# Patient Record
Sex: MALE | Race: OTHER | HISPANIC OR LATINO
[De-identification: names, ages, dates, MRNs, and addresses within clinical notes are randomized per-mention and may not be internally consistent; named-entity substitution may affect disease eponyms.]

---

## 2022-07-07 ENCOUNTER — APPOINTMENT (OUTPATIENT)
Dept: RADIOLOGY | Facility: HOSPITAL | Age: 62
End: 2022-07-07

## 2022-07-07 ENCOUNTER — OUTPATIENT (OUTPATIENT)
Dept: OUTPATIENT SERVICES | Facility: HOSPITAL | Age: 62
LOS: 1 days | End: 2022-07-07
Payer: SELF-PAY

## 2022-07-07 ENCOUNTER — TRANSCRIPTION ENCOUNTER (OUTPATIENT)
Age: 62
End: 2022-07-07

## 2022-07-07 DIAGNOSIS — Z00.8 ENCOUNTER FOR OTHER GENERAL EXAMINATION: ICD-10-CM

## 2022-07-07 PROCEDURE — 72100 X-RAY EXAM L-S SPINE 2/3 VWS: CPT

## 2022-07-07 PROCEDURE — 72100 X-RAY EXAM L-S SPINE 2/3 VWS: CPT | Mod: 26

## 2023-03-01 ENCOUNTER — EMERGENCY (EMERGENCY)
Facility: HOSPITAL | Age: 63
LOS: 1 days | Discharge: HOME | End: 2023-03-01
Attending: EMERGENCY MEDICINE | Admitting: EMERGENCY MEDICINE
Payer: SELF-PAY

## 2023-03-01 VITALS
WEIGHT: 179.9 LBS | HEIGHT: 65 IN | RESPIRATION RATE: 18 BRPM | DIASTOLIC BLOOD PRESSURE: 91 MMHG | SYSTOLIC BLOOD PRESSURE: 149 MMHG | HEART RATE: 70 BPM | TEMPERATURE: 98 F | OXYGEN SATURATION: 98 %

## 2023-03-01 VITALS
RESPIRATION RATE: 18 BRPM | SYSTOLIC BLOOD PRESSURE: 150 MMHG | HEART RATE: 65 BPM | OXYGEN SATURATION: 97 % | DIASTOLIC BLOOD PRESSURE: 86 MMHG

## 2023-03-01 PROCEDURE — 73110 X-RAY EXAM OF WRIST: CPT | Mod: 26,LT

## 2023-03-01 PROCEDURE — 99283 EMERGENCY DEPT VISIT LOW MDM: CPT

## 2023-03-01 PROCEDURE — 99284 EMERGENCY DEPT VISIT MOD MDM: CPT

## 2023-03-01 PROCEDURE — 73110 X-RAY EXAM OF WRIST: CPT

## 2023-03-01 RX ORDER — IBUPROFEN 200 MG
600 TABLET ORAL ONCE
Refills: 0 | Status: COMPLETED | OUTPATIENT
Start: 2023-03-01 | End: 2023-03-01

## 2023-03-01 RX ADMIN — Medication 600 MILLIGRAM(S): at 22:20

## 2023-03-01 NOTE — ED PROVIDER NOTE - CARE PROVIDER_API CALL
Germán Israel (MD)  Orthopedics  [address] [facility] [address], [facility] [address]  [place] [address]  Phone: [phone]  Fax: [phone]  Follow Up Time:

## 2023-03-01 NOTE — ED PROVIDER NOTE - OBJECTIVE STATEMENT
62-year-old male with no significant past medical history presents presents to ED complaining left wrist pain and swelling since this morning s/p fall on the ice.  As per patient he was walking near his house fell backwards he tried to prevent his fall from the left hand since then he has a swelling of the wrist and pain.  Denies tingling or numbness in the fingers

## 2023-03-01 NOTE — ED PROVIDER NOTE - CLINICAL SUMMARY MEDICAL DECISION MAKING FREE TEXT BOX
pt p/w left wrist pain and swelling after fall, on exam had mod edema of wrist.  xray was negative for major fracture, pt was given prefabricated wrist splint and sling and advised cold compress and advised f/u orthopedic

## 2023-03-01 NOTE — ED PROVIDER NOTE - PHYSICAL EXAMINATION
General:     NAD, well-nourished, well-appearing  Head:     NC/AT, EOMI, oral mucosa moist  Neck:     supple  Lungs:     CTA b/l, no w/r/r  CVS:     S1S2, RRR, no m/g/r  Abd:     +BS, s/nt/nd, no organomegaly  Ext:    2+ radial and pedal pulses, no c/c/e  left wrist no gross deformity. mod edema of wrist and decreased ROM, distal NV intact   Neuro: grossly intact

## 2023-03-01 NOTE — ED ADULT NURSE NOTE - OBJECTIVE STATEMENT
pt axo3, c/o wrist pain. pt states he s/p slip and fell this morning. pt denies hitting his head or other extremities. pt states he took tylenol and motrin this am for pain. pt denies chest pain or SOB. safety maintained.

## 2023-03-01 NOTE — ED PROVIDER NOTE - PATIENT PORTAL LINK FT
You can access the FollowMyHealth Patient Portal offered by [facility] by registering at the following website: [email or web address]. By joining [employer]s FollowMyHealth portal, you will also be able to view your health information using other applications (apps) compatible with our system.

## 2023-03-01 NOTE — ED PROVIDER NOTE - NSFOLLOWUPINSTRUCTIONS_ED_ALL_ED_FT
wrist Sprain    A sprain is a stretch or tear in one of the tough, fiber-like tissues (ligaments) in your body. This is caused by an injury to the area such as a twisting mechanism. Symptoms include pain, swelling, or bruising. Rest that area over the next several days and slowly resume activity when tolerated. Ice can help with swelling and pain.   keep wrist in splint ,   apply cold compress  Take Ibuprofen as prescribed   follow up with orthopedic as directed     SEEK IMMEDIATE MEDICAL CARE IF YOU HAVE ANY OF THE FOLLOWING SYMPTOMS: worsening pain, inability to move that body part, numbness or tingling.

## 2023-05-11 ENCOUNTER — TRANSCRIPTION ENCOUNTER (OUTPATIENT)
Age: 63
End: 2023-05-11

## 2023-05-11 ENCOUNTER — INPATIENT (INPATIENT)
Facility: HOSPITAL | Age: 63
LOS: 4 days | Discharge: HOME | DRG: 854 | End: 2023-05-16
Attending: STUDENT IN AN ORGANIZED HEALTH CARE EDUCATION/TRAINING PROGRAM | Admitting: INTERNAL MEDICINE
Payer: MEDICAID

## 2023-05-11 VITALS
HEIGHT: 62 IN | DIASTOLIC BLOOD PRESSURE: 112 MMHG | WEIGHT: 177.91 LBS | TEMPERATURE: 98 F | HEART RATE: 75 BPM | RESPIRATION RATE: 19 BRPM | OXYGEN SATURATION: 96 % | SYSTOLIC BLOOD PRESSURE: 159 MMHG

## 2023-05-11 DIAGNOSIS — K83.09 OTHER CHOLANGITIS: ICD-10-CM

## 2023-05-11 DIAGNOSIS — K83.8 OTHER SPECIFIED DISEASES OF BILIARY TRACT: ICD-10-CM

## 2023-05-11 DIAGNOSIS — R79.89 OTHER SPECIFIED ABNORMAL FINDINGS OF BLOOD CHEMISTRY: ICD-10-CM

## 2023-05-11 LAB
ALBUMIN SERPL ELPH-MCNC: 3.8 G/DL — SIGNIFICANT CHANGE UP (ref 3.3–5)
ALP SERPL-CCNC: 157 U/L — HIGH (ref 40–120)
ALT FLD-CCNC: 139 U/L — HIGH (ref 10–45)
ANION GAP SERPL CALC-SCNC: 8 MMOL/L — SIGNIFICANT CHANGE UP (ref 5–17)
APPEARANCE UR: CLEAR — SIGNIFICANT CHANGE UP
AST SERPL-CCNC: 138 U/L — HIGH (ref 10–40)
BACTERIA # UR AUTO: NEGATIVE /HPF — SIGNIFICANT CHANGE UP
BASOPHILS # BLD AUTO: 0.03 K/UL — SIGNIFICANT CHANGE UP (ref 0–0.2)
BASOPHILS NFR BLD AUTO: 0.3 % — SIGNIFICANT CHANGE UP (ref 0–2)
BILIRUB SERPL-MCNC: 2.5 MG/DL — HIGH (ref 0.2–1.2)
BILIRUB UR-MCNC: ABNORMAL
BLD GP AB SCN SERPL QL: SIGNIFICANT CHANGE UP
BUN SERPL-MCNC: 17 MG/DL — SIGNIFICANT CHANGE UP (ref 7–23)
CALCIUM SERPL-MCNC: 9 MG/DL — SIGNIFICANT CHANGE UP (ref 8.4–10.5)
CHLORIDE SERPL-SCNC: 100 MMOL/L — SIGNIFICANT CHANGE UP (ref 96–108)
CO2 SERPL-SCNC: 28 MMOL/L — SIGNIFICANT CHANGE UP (ref 22–31)
COLOR SPEC: SIGNIFICANT CHANGE UP
CREAT SERPL-MCNC: 0.97 MG/DL — SIGNIFICANT CHANGE UP (ref 0.5–1.3)
DIFF PNL FLD: NEGATIVE — SIGNIFICANT CHANGE UP
EGFR: 88 ML/MIN/1.73M2 — SIGNIFICANT CHANGE UP
EOSINOPHIL # BLD AUTO: 0.06 K/UL — SIGNIFICANT CHANGE UP (ref 0–0.5)
EOSINOPHIL NFR BLD AUTO: 0.6 % — SIGNIFICANT CHANGE UP (ref 0–6)
EPI CELLS # UR: 0 — SIGNIFICANT CHANGE UP
GLUCOSE SERPL-MCNC: 137 MG/DL — HIGH (ref 70–99)
GLUCOSE UR QL: NEGATIVE MG/DL — SIGNIFICANT CHANGE UP
HCT VFR BLD CALC: 44.7 % — SIGNIFICANT CHANGE UP (ref 39–50)
HGB BLD-MCNC: 15.4 G/DL — SIGNIFICANT CHANGE UP (ref 13–17)
IMM GRANULOCYTES NFR BLD AUTO: 0.4 % — SIGNIFICANT CHANGE UP (ref 0–0.9)
KETONES UR-MCNC: NEGATIVE MG/DL — SIGNIFICANT CHANGE UP
LEUKOCYTE ESTERASE UR-ACNC: NEGATIVE — SIGNIFICANT CHANGE UP
LIDOCAIN IGE QN: 154 U/L — SIGNIFICANT CHANGE UP (ref 73–393)
LYMPHOCYTES # BLD AUTO: 0.38 K/UL — LOW (ref 1–3.3)
LYMPHOCYTES # BLD AUTO: 3.5 % — LOW (ref 13–44)
MCHC RBC-ENTMCNC: 31.2 PG — SIGNIFICANT CHANGE UP (ref 27–34)
MCHC RBC-ENTMCNC: 34.5 GM/DL — SIGNIFICANT CHANGE UP (ref 32–36)
MCV RBC AUTO: 90.5 FL — SIGNIFICANT CHANGE UP (ref 80–100)
MONOCYTES # BLD AUTO: 0.16 K/UL — SIGNIFICANT CHANGE UP (ref 0–0.9)
MONOCYTES NFR BLD AUTO: 1.5 % — LOW (ref 2–14)
NEUTROPHILS # BLD AUTO: 10.05 K/UL — HIGH (ref 1.8–7.4)
NEUTROPHILS NFR BLD AUTO: 93.7 % — HIGH (ref 43–77)
NITRITE UR-MCNC: NEGATIVE — SIGNIFICANT CHANGE UP
NRBC # BLD: 0 /100 WBCS — SIGNIFICANT CHANGE UP (ref 0–0)
PH UR: 6.5 — SIGNIFICANT CHANGE UP (ref 5–8)
PLATELET # BLD AUTO: 177 K/UL — SIGNIFICANT CHANGE UP (ref 150–400)
POTASSIUM SERPL-MCNC: 3.7 MMOL/L — SIGNIFICANT CHANGE UP (ref 3.5–5.3)
POTASSIUM SERPL-SCNC: 3.7 MMOL/L — SIGNIFICANT CHANGE UP (ref 3.5–5.3)
PROT SERPL-MCNC: 7.4 G/DL — SIGNIFICANT CHANGE UP (ref 6–8.3)
PROT UR-MCNC: 30 MG/DL
RAPID RVP RESULT: SIGNIFICANT CHANGE UP
RBC # BLD: 4.94 M/UL — SIGNIFICANT CHANGE UP (ref 4.2–5.8)
RBC # FLD: 13 % — SIGNIFICANT CHANGE UP (ref 10.3–14.5)
RBC CASTS # UR COMP ASSIST: 0 /HPF — SIGNIFICANT CHANGE UP (ref 0–4)
SARS-COV-2 RNA SPEC QL NAA+PROBE: SIGNIFICANT CHANGE UP
SODIUM SERPL-SCNC: 136 MMOL/L — SIGNIFICANT CHANGE UP (ref 135–145)
SP GR SPEC: 1.01 — SIGNIFICANT CHANGE UP (ref 1–1.03)
UROBILINOGEN FLD QL: 4 MG/DL (ref 0.2–1)
WBC # BLD: 10.72 K/UL — HIGH (ref 3.8–10.5)
WBC # FLD AUTO: 10.72 K/UL — HIGH (ref 3.8–10.5)
WBC UR QL: 0 /HPF — SIGNIFICANT CHANGE UP (ref 0–5)

## 2023-05-11 PROCEDURE — 74022 RADEX COMPL AQT ABD SERIES: CPT | Mod: 26

## 2023-05-11 PROCEDURE — 99223 1ST HOSP IP/OBS HIGH 75: CPT

## 2023-05-11 PROCEDURE — 76705 ECHO EXAM OF ABDOMEN: CPT | Mod: 26

## 2023-05-11 PROCEDURE — 74177 CT ABD & PELVIS W/CONTRAST: CPT | Mod: 26,MA

## 2023-05-11 PROCEDURE — 99285 EMERGENCY DEPT VISIT HI MDM: CPT

## 2023-05-11 RX ORDER — AMLODIPINE BESYLATE 2.5 MG/1
5 TABLET ORAL DAILY
Refills: 0 | Status: DISCONTINUED | OUTPATIENT
Start: 2023-05-12 | End: 2023-05-15

## 2023-05-11 RX ORDER — INDOMETHACIN 50 MG
50 CAPSULE ORAL ONCE
Refills: 0 | Status: COMPLETED | OUTPATIENT
Start: 2023-05-12 | End: 2023-05-12

## 2023-05-11 RX ORDER — PIPERACILLIN AND TAZOBACTAM 4; .5 G/20ML; G/20ML
3.38 INJECTION, POWDER, LYOPHILIZED, FOR SOLUTION INTRAVENOUS ONCE
Refills: 0 | Status: COMPLETED | OUTPATIENT
Start: 2023-05-11 | End: 2023-05-11

## 2023-05-11 RX ORDER — FAMOTIDINE 10 MG/ML
20 INJECTION INTRAVENOUS ONCE
Refills: 0 | Status: COMPLETED | OUTPATIENT
Start: 2023-05-11 | End: 2023-05-11

## 2023-05-11 RX ORDER — LANOLIN ALCOHOL/MO/W.PET/CERES
3 CREAM (GRAM) TOPICAL AT BEDTIME
Refills: 0 | Status: DISCONTINUED | OUTPATIENT
Start: 2023-05-11 | End: 2023-05-15

## 2023-05-11 RX ORDER — ACETAMINOPHEN 500 MG
650 TABLET ORAL EVERY 6 HOURS
Refills: 0 | Status: DISCONTINUED | OUTPATIENT
Start: 2023-05-11 | End: 2023-05-15

## 2023-05-11 RX ORDER — MORPHINE SULFATE 50 MG/1
4 CAPSULE, EXTENDED RELEASE ORAL ONCE
Refills: 0 | Status: DISCONTINUED | OUTPATIENT
Start: 2023-05-11 | End: 2023-05-11

## 2023-05-11 RX ORDER — ACETAMINOPHEN 500 MG
650 TABLET ORAL ONCE
Refills: 0 | Status: COMPLETED | OUTPATIENT
Start: 2023-05-11 | End: 2023-05-11

## 2023-05-11 RX ORDER — MORPHINE SULFATE 50 MG/1
2 CAPSULE, EXTENDED RELEASE ORAL EVERY 4 HOURS
Refills: 0 | Status: DISCONTINUED | OUTPATIENT
Start: 2023-05-11 | End: 2023-05-15

## 2023-05-11 RX ORDER — ONDANSETRON 8 MG/1
4 TABLET, FILM COATED ORAL ONCE
Refills: 0 | Status: COMPLETED | OUTPATIENT
Start: 2023-05-11 | End: 2023-05-11

## 2023-05-11 RX ORDER — ONDANSETRON 8 MG/1
4 TABLET, FILM COATED ORAL EVERY 8 HOURS
Refills: 0 | Status: DISCONTINUED | OUTPATIENT
Start: 2023-05-11 | End: 2023-05-15

## 2023-05-11 RX ORDER — ASPIRIN/CALCIUM CARB/MAGNESIUM 324 MG
81 TABLET ORAL DAILY
Refills: 0 | Status: DISCONTINUED | OUTPATIENT
Start: 2023-05-11 | End: 2023-05-14

## 2023-05-11 RX ORDER — ENOXAPARIN SODIUM 100 MG/ML
40 INJECTION SUBCUTANEOUS EVERY 24 HOURS
Refills: 0 | Status: DISCONTINUED | OUTPATIENT
Start: 2023-05-11 | End: 2023-05-14

## 2023-05-11 RX ORDER — FAMOTIDINE 10 MG/ML
20 INJECTION INTRAVENOUS ONCE
Refills: 0 | Status: DISCONTINUED | OUTPATIENT
Start: 2023-05-11 | End: 2023-05-11

## 2023-05-11 RX ORDER — SODIUM CHLORIDE 9 MG/ML
2000 INJECTION INTRAMUSCULAR; INTRAVENOUS; SUBCUTANEOUS ONCE
Refills: 0 | Status: COMPLETED | OUTPATIENT
Start: 2023-05-11 | End: 2023-05-11

## 2023-05-11 RX ORDER — PIPERACILLIN AND TAZOBACTAM 4; .5 G/20ML; G/20ML
3.38 INJECTION, POWDER, LYOPHILIZED, FOR SOLUTION INTRAVENOUS EVERY 8 HOURS
Refills: 0 | Status: DISCONTINUED | OUTPATIENT
Start: 2023-05-11 | End: 2023-05-15

## 2023-05-11 RX ADMIN — ONDANSETRON 4 MILLIGRAM(S): 8 TABLET, FILM COATED ORAL at 15:15

## 2023-05-11 RX ADMIN — Medication 650 MILLIGRAM(S): at 17:17

## 2023-05-11 RX ADMIN — PIPERACILLIN AND TAZOBACTAM 3.38 GRAM(S): 4; .5 INJECTION, POWDER, LYOPHILIZED, FOR SOLUTION INTRAVENOUS at 17:32

## 2023-05-11 RX ADMIN — SODIUM CHLORIDE 2000 MILLILITER(S): 9 INJECTION INTRAMUSCULAR; INTRAVENOUS; SUBCUTANEOUS at 15:14

## 2023-05-11 RX ADMIN — FAMOTIDINE 100 MILLIGRAM(S): 10 INJECTION INTRAVENOUS at 15:17

## 2023-05-11 RX ADMIN — PIPERACILLIN AND TAZOBACTAM 200 GRAM(S): 4; .5 INJECTION, POWDER, LYOPHILIZED, FOR SOLUTION INTRAVENOUS at 17:02

## 2023-05-11 RX ADMIN — Medication 650 MILLIGRAM(S): at 23:07

## 2023-05-11 RX ADMIN — MORPHINE SULFATE 4 MILLIGRAM(S): 50 CAPSULE, EXTENDED RELEASE ORAL at 15:45

## 2023-05-11 RX ADMIN — PIPERACILLIN AND TAZOBACTAM 25 GRAM(S): 4; .5 INJECTION, POWDER, LYOPHILIZED, FOR SOLUTION INTRAVENOUS at 22:05

## 2023-05-11 RX ADMIN — Medication 650 MILLIGRAM(S): at 22:07

## 2023-05-11 RX ADMIN — FAMOTIDINE 20 MILLIGRAM(S): 10 INJECTION INTRAVENOUS at 15:47

## 2023-05-11 RX ADMIN — MORPHINE SULFATE 4 MILLIGRAM(S): 50 CAPSULE, EXTENDED RELEASE ORAL at 15:15

## 2023-05-11 RX ADMIN — SODIUM CHLORIDE 2000 MILLILITER(S): 9 INJECTION INTRAMUSCULAR; INTRAVENOUS; SUBCUTANEOUS at 17:00

## 2023-05-11 NOTE — ED PROVIDER NOTE - CLINICAL SUMMARY MEDICAL DECISION MAKING FREE TEXT BOX
adult male with cute onset epigastric abdominal pain and rigors.  Vitals are normal no fever no tachycardia no hypotension.  On exam has epigastric tenderness palpation mild right upper quadrant tenderness palpation.  We will initially get this chest x-ray to rule out free air.  Plan for blood work urinalysis CT abdomen pelvis to rule out intra-abdominal cause of infection.  Could also be gastritis or stomach ulcer or peptic ulcer.  Could also be pancreatitis.  We will get a lipase.  Disposition as per work-up.

## 2023-05-11 NOTE — ED ADULT NURSE NOTE - OBJECTIVE STATEMENT
Pt came from home with c/o epigastric pain since this morning. Pt with hx HTN. States that shortly after eating breakfast this morning he had an onset of 10/10 epigastric pain. Pt with c/o chills and nausea on arrival. No fevers, diarrhea or any other complaints.

## 2023-05-11 NOTE — ED PROVIDER NOTE - OBJECTIVE STATEMENT
62-year-old male history of hypertension presents to the ED with acute onset upper abdominal pain that started early this morning.  Associated with nausea and vomiting.  Also having full body chills and rigors.  No diarrhea.  Non-smoker no alcohol use no drug use.  No recent travel.  No sick contacts at home.  Does not use Advil or Tylenol.

## 2023-05-11 NOTE — H&P ADULT - HISTORY OF PRESENT ILLNESS
63yo male PMH of HTN presenting to Er with cc of abdominal pain  63yo male PMH of HTN presenting to Er with cc of abdominal pain.  Patient stated that his pain started acutely in the middle of his abdomen since 9AM.  He stated he drank some fer hoping that it would get better but it progressively worsened.  At 1pm he started to vomit and his pain traveled to his RUQ.  He decided to come to the ER for further evaluation.  He denies fever, cough, chest pain, Travel hx, diarrhea.  He has never had a colonoscopy in the past.      In the ER, GI was called

## 2023-05-11 NOTE — ED PROVIDER NOTE - CONSTITUTIONAL, MLM
normal... patient is actively having rigors, awake, alert, oriented to person, place, time/situation and in no apparent distress.

## 2023-05-11 NOTE — H&P ADULT - ASSESSMENT
63yo male PMH of HTN presenting to Er with cc of abdominal pain.    #Severe sepsis with fever, leukocytosis, tachycardia likely due to acute cholecystitis with CBD dilatation with transaminitis and hyperbilirubinemia  CONSULT: GI and surgery called by ER; Dr Ryan and Dr Liao  -Will monitor F/WBC count, f/u UCX, BCX  -NPO, IVF, zosyn 3.375gm IV q8hrs  -For ERCP in AM as per GI  -zofran prn, tylenol/morphine prn pain  -Monitor LFTs    #HTN  -C/w norvasc 5mg daily, ASA 81mg daily    DVT PPX: Lovenox  AM labs, Full code  DISP: Pending course  5/11:Sangita daughter [phone] at bedside, answered all questions, in agreement with care plan

## 2023-05-11 NOTE — H&P ADULT - NSHPLABSRESULTS_GEN_ALL_CORE
15.4   10.72 )-----------( 177      ( 11 May 2023 15:20 )             44.7           136  |  100  |  17  ----------------------------<  137  3.7   |  28  |  0.97    Ca    9.0      11 May 2023 15:20    TPro  7.4  /  Alb  3.8  /  TBili  2.5  /  DBili  x   /  AST  138  /  ALT  139  /  AlkPhos  157      Urinalysis Basic - ( 11 May 2023 16:10 )    Color: Dark Yellow / Appearance: Clear / S.015 / pH: x  Gluc: x / Ketone: Negative mg/dL  / Bili: Small / Urobili: 4.0 mg/dL   Blood: x / Protein: 30 mg/dL / Nitrite: Negative   Leuk Esterase: Negative / RBC: 0 /HPF / WBC 0 /HPF   Sq Epi: x / Non Sq Epi: x / Bacteria: Negative /HPF    Lipase, Serum: 154 U/L (23 @ 15:20)  SARS-CoV-2: NotDetec (11 May 2023 15:20)    RADIOLOGY  CT Abdomen and Pelvis w/ IV Cont (23 @ 16:46) >  IMPRESSION:  Gallbladder distention with biliary ductal dilatation. No radiopaque   cholelithiasis or choledocholithiasis. Recommend correlating to the   presence of a Rosenbaum sign as well as correlation with LFTs. Further   evaluation with gallbladder ultrasound can beconsidered as clinically   warranted.< from: US Abdomen Upper Quadrant Right (23 @ 16:01) >    IMPRESSION:  Increased caliber of the extrahepatic CBD identified measuring 11 mm.   Gallstone and echogenic sludge. No gallbladder wall thickening.    Consultant(s) Notes Reveiwed [x ] Yes   GI   Care Discussed with [x ] Consultants  [ x] Patient  [ ] Family  [ ] /   [ x] Other; RN

## 2023-05-11 NOTE — H&P ADULT - NSHPREVIEWOFSYSTEMS_GEN_ALL_CORE
REVIEW OF SYSTEMS:  CONSTITUTIONAL: No fever, weight loss, or fatigue  EYES: No eye pain, visual disturbances, or discharge  ENMT:  No difficulty hearing, tinnitus, vertigo; No sinus or throat pain  NECK: No pain or stiffness  BREASTS: No pain, masses, or nipple discharge  RESPIRATORY: No cough, wheezing, chills or hemoptysis; No shortness of breath  CARDIOVASCULAR: No chest pain, palpitations, dizziness, or leg swelling  GASTROINTESTINAL: No abdominal or epigastric pain. No nausea, vomiting, or hematemesis; No diarrhea or constipation. No melena or hematochezia.  GENITOURINARY: No dysuria, frequency, hematuria, or incontinence  NEUROLOGICAL: No headaches, memory loss, loss of strength, numbness, or tremors  SKIN: No itching, burning, rashes, or lesions   LYMPH NODES: No enlarged glands  ENDOCRINE: No heat or cold intolerance; No hair loss  MUSCULOSKELETAL: No joint pain or swelling; No muscle, back, or extremity pain  PSYCHIATRIC: No depression, anxiety, mood swings, or difficulty sleeping  HEME/LYMPH: No easy bruising, or bleeding gums  ALLERY AND IMMUNOLOGIC: No hives or eczema    ALL ROS REVIEWED AND NORMAL EXCEPT AS STATED ABOVE REVIEW OF SYSTEMS:  CONSTITUTIONAL: No fever, weight loss, or fatigue  EYES: No eye pain, visual disturbances, or discharge  ENMT:  No difficulty hearing, tinnitus, vertigo; No sinus or throat pain  NECK: No pain or stiffness  BREASTS: No pain, masses, or nipple discharge  RESPIRATORY: No cough, wheezing, chills or hemoptysis; No shortness of breath  CARDIOVASCULAR: No chest pain, palpitations, dizziness, or leg swelling  GASTROINTESTINAL:+++ abdominal/epigastric pain. ++ nausea, vomiting; No diarrhea or constipation. No melena or hematochezia.  GENITOURINARY: No dysuria, frequency, hematuria, or incontinence  NEUROLOGICAL: No headaches, memory loss, loss of strength, numbness, or tremors  SKIN: No itching, burning, rashes, or lesions   LYMPH NODES: No enlarged glands  ENDOCRINE: No heat or cold intolerance; No hair loss  MUSCULOSKELETAL: No joint pain or swelling; No muscle, back, or extremity pain  PSYCHIATRIC: No depression, anxiety, mood swings, or difficulty sleeping  HEME/LYMPH: No easy bruising, or bleeding gums  ALLERY AND IMMUNOLOGIC: No hives or eczema    ALL ROS REVIEWED AND NORMAL EXCEPT AS STATED ABOVE

## 2023-05-11 NOTE — ED ADULT NURSE NOTE - NSFALLUNIVINTERV_ED_ALL_ED
Bed/Stretcher in lowest position, wheels locked, appropriate side rails in place/Call bell, personal items and telephone in reach/Instruct patient to call for assistance before getting out of bed/chair/stretcher/Non-slip footwear applied when patient is off stretcher/[place] to call system/Physically safe environment - no spills, clutter or unnecessary equipment/Purposeful proactive rounding/Room/bathroom lighting operational, light cord in reach

## 2023-05-11 NOTE — H&P ADULT - NSHPPHYSICALEXAM_GEN_ALL_CORE
Vital Signs Last 24 Hrs  T(F): 104.7 (11 May 2023 17:04), Max: 104.7 (11 May 2023 17:04)  HR: 109 (11 May 2023 17:04) (75 - 109)  BP: 112/68 (11 May 2023 17:04) (112/68 - 159/112)  RR: 18 (11 May 2023 17:04) (18 - 19)  SpO2: 96% (11 May 2023 17:04) (96% - 96%)    PHYSICAL EXAM:  GENERAL: NAD, well-groomed, well-developed  HEAD:  Atraumatic, Normocephalic  EYES: EOMI, conjunctiva and sclera clear  ENMT: Moist mucous membranes, Good dentition, no thrush  NECK: Supple, No JVD  CHEST/LUNG: Clear to auscultation bilaterally, good air entry, non-labored breathing  HEART: RRR; S1/S2, No murmur  ABDOMEN: Soft, Nontender, Nondistended; Bowel sounds present  VASCULAR: Normal pulses, Normal capillary refill  EXTREMITIES: No calf tenderness, No cyanosis, No edema  LYMPH: Normal; No lymphadenopathy noted  SKIN: Warm, Intact  PSYCH: Normal mood, Normal affect  NERVOUS SYSTEM:  A/O x3, Good concentration; CN 2-12 intact, No focal deficits Vital Signs Last 24 Hrs  T(F): 104.7 (11 May 2023 17:04), Max: 104.7 (11 May 2023 17:04)  HR: 109 (11 May 2023 17:04) (75 - 109)  BP: 112/68 (11 May 2023 17:04) (112/68 - 159/112)  RR: 18 (11 May 2023 17:04) (18 - 19)  SpO2: 96% (11 May 2023 17:04) (96% - 96%)    PHYSICAL EXAM:  GENERAL: NAD, AAox3, speaking in full sentences,  well-groomed, well-developed  HEAD:  Atraumatic, Normocephalic  EYES: EOMI, conjunctiva and sclera clear  ENMT: Moist mucous membranes, Good dentition, no thrush  NECK: Supple, No JVD  CHEST/LUNG: Clear to auscultation bilaterally, good air entry, non-labored breathing  HEART: RRR; S1/S2, No murmur  ABDOMEN: Soft, ++tenderness, protuberant, Bowel sounds present  VASCULAR: Normal pulses, Normal capillary refill  EXTREMITIES: No calf tenderness, No cyanosis, No edema  LYMPH: Normal; No lymphadenopathy noted  SKIN: Warm, Intact  PSYCH: Normal mood, Normal affect  NERVOUS SYSTEM:  A/O x3, Good concentration; CN 2-12 intact, No focal deficits

## 2023-05-11 NOTE — H&P ADULT - NSHPSOCIALHISTORY_GEN_ALL_CORE
Social History:    Marital Status: (  ) , (  ) Single, (  ) , (  ) , (  )   # of Children:   Lives with: (  ) alone, (  ) children, (  ) spouse, (  ) parents, (  ) siblings, (  ) friends, (  ) other:   Occupation:     Substance Use/Illicit Drugs: (  ) never used vs other:   Tobacco Usage: (  ) never smoked, (  ) former smoker, (  ) current smoker and Total Pack-Years:   Last Alcohol Usage/Frequency/Amount/Withdrawal/Hx of Abuse:    Foreign travel:   Animal exposure: Social History:    Marital Status: (x  ) , (  ) Single, (  ) , (  ) , (  )   # of Children: 2  Lives with: (  ) alone, (  ) children, (x  ) spouse, (  ) parents, (  ) siblings, (  ) friends, (  ) other:     Substance Use/Illicit Drugs: (x  ) never used vs other:   Tobacco Usage: (  x) never smoked, (  ) former smoker, (  ) current smoker and Total Pack-Years:   Last Alcohol Usage/Frequency/Amount/Withdrawal/Hx of Abuse:  Denies  Foreign travel: Denies  Animal exposure:Denies

## 2023-05-12 LAB
ALBUMIN SERPL ELPH-MCNC: 2.9 G/DL — LOW (ref 3.3–5)
ALP SERPL-CCNC: 118 U/L — SIGNIFICANT CHANGE UP (ref 40–120)
ALT FLD-CCNC: 272 U/L — HIGH (ref 10–45)
ANION GAP SERPL CALC-SCNC: 7 MMOL/L — SIGNIFICANT CHANGE UP (ref 5–17)
AST SERPL-CCNC: 205 U/L — HIGH (ref 10–40)
BILIRUB DIRECT SERPL-MCNC: 3.5 MG/DL — HIGH (ref 0–0.3)
BILIRUB INDIRECT FLD-MCNC: 1.4 MG/DL — HIGH (ref 0.2–1)
BILIRUB SERPL-MCNC: 4.9 MG/DL — HIGH (ref 0.2–1.2)
BUN SERPL-MCNC: 20 MG/DL — SIGNIFICANT CHANGE UP (ref 7–23)
CALCIUM SERPL-MCNC: 8 MG/DL — LOW (ref 8.4–10.5)
CHLORIDE SERPL-SCNC: 102 MMOL/L — SIGNIFICANT CHANGE UP (ref 96–108)
CO2 SERPL-SCNC: 28 MMOL/L — SIGNIFICANT CHANGE UP (ref 22–31)
CREAT SERPL-MCNC: 1.29 MG/DL — SIGNIFICANT CHANGE UP (ref 0.5–1.3)
CULTURE RESULTS: SIGNIFICANT CHANGE UP
EGFR: 62 ML/MIN/1.73M2 — SIGNIFICANT CHANGE UP
GLUCOSE SERPL-MCNC: 113 MG/DL — HIGH (ref 70–99)
HCT VFR BLD CALC: 40.2 % — SIGNIFICANT CHANGE UP (ref 39–50)
HCV AB S/CO SERPL IA: 0.07 S/CO — SIGNIFICANT CHANGE UP (ref 0–0.99)
HCV AB SERPL-IMP: SIGNIFICANT CHANGE UP
HGB BLD-MCNC: 13.7 G/DL — SIGNIFICANT CHANGE UP (ref 13–17)
MAGNESIUM SERPL-MCNC: 1.6 MG/DL — SIGNIFICANT CHANGE UP (ref 1.6–2.6)
MCHC RBC-ENTMCNC: 30.9 PG — SIGNIFICANT CHANGE UP (ref 27–34)
MCHC RBC-ENTMCNC: 34.1 GM/DL — SIGNIFICANT CHANGE UP (ref 32–36)
MCV RBC AUTO: 90.7 FL — SIGNIFICANT CHANGE UP (ref 80–100)
NRBC # BLD: 0 /100 WBCS — SIGNIFICANT CHANGE UP (ref 0–0)
PLATELET # BLD AUTO: 161 K/UL — SIGNIFICANT CHANGE UP (ref 150–400)
POTASSIUM SERPL-MCNC: 4.1 MMOL/L — SIGNIFICANT CHANGE UP (ref 3.5–5.3)
POTASSIUM SERPL-SCNC: 4.1 MMOL/L — SIGNIFICANT CHANGE UP (ref 3.5–5.3)
PROT SERPL-MCNC: 6.3 G/DL — SIGNIFICANT CHANGE UP (ref 6–8.3)
RBC # BLD: 4.43 M/UL — SIGNIFICANT CHANGE UP (ref 4.2–5.8)
RBC # FLD: 13.3 % — SIGNIFICANT CHANGE UP (ref 10.3–14.5)
SODIUM SERPL-SCNC: 137 MMOL/L — SIGNIFICANT CHANGE UP (ref 135–145)
SPECIMEN SOURCE: SIGNIFICANT CHANGE UP
WBC # BLD: 19.69 K/UL — HIGH (ref 3.8–10.5)
WBC # FLD AUTO: 19.69 K/UL — HIGH (ref 3.8–10.5)

## 2023-05-12 PROCEDURE — 99232 SBSQ HOSP IP/OBS MODERATE 35: CPT

## 2023-05-12 PROCEDURE — 43262 ENDO CHOLANGIOPANCREATOGRAPH: CPT | Mod: 59

## 2023-05-12 PROCEDURE — 43274 ERCP DUCT STENT PLACEMENT: CPT | Mod: 59

## 2023-05-12 PROCEDURE — 99222 1ST HOSP IP/OBS MODERATE 55: CPT

## 2023-05-12 PROCEDURE — 43264 ERCP REMOVE DUCT CALCULI: CPT | Mod: 59

## 2023-05-12 PROCEDURE — 99233 SBSQ HOSP IP/OBS HIGH 50: CPT | Mod: 25

## 2023-05-12 DEVICE — WIRE GUIDE BILIARY DREAMWIRE ANG 0.35INX260CM
Type: IMPLANTABLE DEVICE | Status: NON-FUNCTIONAL
Removed: 2023-05-12

## 2023-05-12 DEVICE — STENT PANC ZIMMON 5FRX3CM
Type: IMPLANTABLE DEVICE | Status: NON-FUNCTIONAL
Removed: 2023-05-12

## 2023-05-12 DEVICE — AUTOTOME CANNULATING SPHINCTEROTOME RX 44 20MM
Type: IMPLANTABLE DEVICE | Status: NON-FUNCTIONAL
Removed: 2023-05-12

## 2023-05-12 DEVICE — GWIRE JAG REVOLUTION ANG 260CM
Type: IMPLANTABLE DEVICE | Status: NON-FUNCTIONAL
Removed: 2023-05-12

## 2023-05-12 DEVICE — HYDRATOME 44
Type: IMPLANTABLE DEVICE | Status: NON-FUNCTIONAL
Removed: 2023-05-12

## 2023-05-12 DEVICE — RESOLUTION CLIP HEMOSTATIC DEVICE
Type: IMPLANTABLE DEVICE | Status: NON-FUNCTIONAL
Removed: 2023-05-12

## 2023-05-12 DEVICE — STENT PANCREAS CATH PUSH 5FRX170CM
Type: IMPLANTABLE DEVICE | Status: NON-FUNCTIONAL
Removed: 2023-05-12

## 2023-05-12 DEVICE — STENT BIL WALL RX FC RMV US 10X40MM
Type: IMPLANTABLE DEVICE | Status: NON-FUNCTIONAL
Removed: 2023-05-12

## 2023-05-12 RX ORDER — SODIUM CHLORIDE 9 MG/ML
1000 INJECTION, SOLUTION INTRAVENOUS
Refills: 0 | Status: DISCONTINUED | OUTPATIENT
Start: 2023-05-12 | End: 2023-05-12

## 2023-05-12 RX ORDER — FENTANYL CITRATE 50 UG/ML
25 INJECTION INTRAVENOUS
Refills: 0 | Status: DISCONTINUED | OUTPATIENT
Start: 2023-05-12 | End: 2023-05-15

## 2023-05-12 RX ORDER — ONDANSETRON 8 MG/1
4 TABLET, FILM COATED ORAL ONCE
Refills: 0 | Status: DISCONTINUED | OUTPATIENT
Start: 2023-05-12 | End: 2023-05-15

## 2023-05-12 RX ORDER — SODIUM CHLORIDE 9 MG/ML
1000 INJECTION, SOLUTION INTRAVENOUS
Refills: 0 | Status: DISCONTINUED | OUTPATIENT
Start: 2023-05-12 | End: 2023-05-15

## 2023-05-12 RX ORDER — INDOMETHACIN 50 MG
50 CAPSULE ORAL ONCE
Refills: 0 | Status: DISCONTINUED | OUTPATIENT
Start: 2023-05-12 | End: 2023-05-16

## 2023-05-12 RX ORDER — HYDROMORPHONE HYDROCHLORIDE 2 MG/ML
0.5 INJECTION INTRAMUSCULAR; INTRAVENOUS; SUBCUTANEOUS
Refills: 0 | Status: DISCONTINUED | OUTPATIENT
Start: 2023-05-12 | End: 2023-05-15

## 2023-05-12 RX ADMIN — ENOXAPARIN SODIUM 40 MILLIGRAM(S): 100 INJECTION SUBCUTANEOUS at 12:16

## 2023-05-12 RX ADMIN — Medication 50 MILLIGRAM(S): at 15:39

## 2023-05-12 RX ADMIN — PIPERACILLIN AND TAZOBACTAM 25 GRAM(S): 4; .5 INJECTION, POWDER, LYOPHILIZED, FOR SOLUTION INTRAVENOUS at 18:20

## 2023-05-12 RX ADMIN — Medication 81 MILLIGRAM(S): at 12:16

## 2023-05-12 RX ADMIN — PIPERACILLIN AND TAZOBACTAM 25 GRAM(S): 4; .5 INJECTION, POWDER, LYOPHILIZED, FOR SOLUTION INTRAVENOUS at 05:35

## 2023-05-12 RX ADMIN — Medication 650 MILLIGRAM(S): at 23:38

## 2023-05-12 RX ADMIN — Medication 650 MILLIGRAM(S): at 22:38

## 2023-05-12 NOTE — PATIENT PROFILE ADULT - DO YOU LACK THE NECESSARY SUPPORT TO HELP YOU COPE WITH LIFE CHALLENGES?
Last Appointment:  1/18/2023  Future Appointments   Date Time Provider Yandel Leonardoi   2/24/2023 11:20 AM Andree Molina DO ACC Pulm [facility]   4/11/2023  2:00 PM Echo Ellison DPM Col Podiatry [facility]   4/18/2023  1:30 PM Miguel Encarnacion  [address] [place] [address] no

## 2023-05-12 NOTE — PROGRESS NOTE ADULT - ASSESSMENT
61yo male PMH of HTN presenting to Er with cc of abdominal pain.    Acute cholangitis with severe sepsis  Transaminitis  Elevated bilirubin  Obstructing Gallstone in CBD  - worsened LFTs today and WBC today  - for ERCP today  - cont antibiotics  - Discussed with GI Dr. Ryan regarding LFTS and need for ERCP which he is aware and will take for endoscopy today  - pain control  - NPO    HTN  -C/w norvasc 5mg daily, ASA 81mg daily    DVT PPX: Lovenox  AM labs, Full code  DISP: Pending course  [phone] at bedside, answered all questions, in agreement with care plan

## 2023-05-12 NOTE — PROGRESS NOTE ADULT - SUBJECTIVE AND OBJECTIVE BOX
Patient is a 62y old  Male who presents with a chief complaint of Abdominal pain (11 May 2023 18:29)    significant abdominal pain but improved from yesterday with pain medications.      NPO for ERCP     Patient seen and examined at bedside. No overnight events reported.     ALLERGIES:  No Known Allergies    MEDICATIONS  (STANDING):  amLODIPine   Tablet 5 milliGRAM(s) Oral daily  aspirin enteric coated 81 milliGRAM(s) Oral daily  enoxaparin Injectable 40 milliGRAM(s) SubCutaneous every 24 hours  indomethacin Suppository 50 milliGRAM(s) Rectal once  piperacillin/tazobactam IVPB.. 3.375 Gram(s) IV Intermittent every 8 hours    MEDICATIONS  (PRN):  acetaminophen     Tablet .. 650 milliGRAM(s) Oral every 6 hours PRN Temp greater or equal to 38C (100.4F), Mild Pain (1 - 3)  aluminum hydroxide/magnesium hydroxide/simethicone Suspension 30 milliLiter(s) Oral every 4 hours PRN Dyspepsia  melatonin 3 milliGRAM(s) Oral at bedtime PRN Insomnia  morphine  - Injectable 2 milliGRAM(s) IV Push every 4 hours PRN Severe Pain (7 - 10)  ondansetron Injectable 4 milliGRAM(s) IV Push every 8 hours PRN Nausea and/or Vomiting    Vital Signs Last 24 Hrs  T(F): 98 (12 May 2023 05:32), Max: 104.7 (11 May 2023 17:04)  HR: 81 (12 May 2023 05:32) (75 - 109)  BP: 115/70 (12 May 2023 05:32) (112/68 - 159/112)  RR: 18 (12 May 2023 05:32) (18 - 19)  SpO2: 96% (12 May 2023 05:32) (96% - 96%)  I&O's Summary    PHYSICAL EXAM:  General: NAD, A/O x 3  ENT: No gross hearing impairment, Moist mucous membranes, no thrush  Neck: Supple, No JVD  Lungs: Clear to auscultation bilaterally, good air entry, non-labored breathing  Cardio: RRR, S1/S2, No murmur  Abdomen: Soft, Nontender, Nondistended; Bowel sounds present  Extremities: No calf tenderness, No cyanosis, No pitting edema  Psych: Appropriate mood and affect    LABS:                13.7   19.69 )-----------( 161      ( 12 May 2023 06:14 )             40.2       137  |  102  |  20  ----------------------------<  113  4.1   |  28  |  1.29    Ca    8.0      12 May 2023 06:14  Mg     1.6         TPro  6.3  /  Alb  2.9  /  TBili  4.9  /  DBili  3.5  /  AST  205  /  ALT  272  /  AlkPhos  118      Lipase, Serum: 154 U/L (23 @ 15:20)    Urinalysis Basic - ( 11 May 2023 16:10 )    Color: Dark Yellow / Appearance: Clear / S.015 / pH: x  Gluc: x / Ketone: Negative mg/dL  / Bili: Small / Urobili: 4.0 mg/dL   Blood: x / Protein: 30 mg/dL / Nitrite: Negative   Leuk Esterase: Negative / RBC: 0 /HPF / WBC 0 /HPF   Sq Epi: x / Non Sq Epi: x / Bacteria: Negative /HPF    RADIOLOGY & ADDITIONAL TESTS:    Care Discussed with Consultants/Other Providers: Dr. Ryan

## 2023-05-12 NOTE — CONSULT NOTE ADULT - ASSESSMENT
IMPRESSION: Cholelithiasis, CBD obstruction - probable choledocholithiasis    PLAN: ERCP - Dr. Ryan - today           Probable LAP GB in near future
Acute abd pain, fever, chills, elevated bili, dilated duct highly suspicious for cholangitis.

## 2023-05-12 NOTE — PATIENT PROFILE ADULT - FALL HARM RISK - HARM RISK INTERVENTIONS
Assistance with ambulation/Assistance OOB with selected safe patient handling equipment/Communicate Risk of Fall with Harm to all staff/Discuss with provider need for PT consult/Monitor gait and stability/Orthostatic vital signs/Provide patient with walking aids - walker, cane, crutches/Reinforce activity limits and safety measures with patient and family/Sit up slowly, dangle for a short time, stand at bedside before walking/Tailored Fall Risk Interventions/Use of alarms - bed, chair and/or voice tab/Visual Cue: Yellow wristband and red socks/Bed in lowest position, wheels locked, appropriate side rails in place/Call bell, personal items and telephone in reach/Instruct patient to call for assistance before getting out of bed or chair/Non-slip footwear when patient is out of bed/[place] to call system/Physically safe environment - no spills, clutter or unnecessary equipment/Purposeful Proactive Rounding/Room/bathroom lighting operational, light cord in reach

## 2023-05-12 NOTE — PROGRESS NOTE ADULT - SUBJECTIVE AND OBJECTIVE BOX
INTERVAL HPI/OVERNIGHT EVENTS:  HPI:    61 y/o male admitted with abdominal pain, found to have choledocholithiases. Patient seen and examined.        MEDICATIONS  (STANDING):  amLODIPine   Tablet 5 milliGRAM(s) Oral daily  aspirin enteric coated 81 milliGRAM(s) Oral daily  enoxaparin Injectable 40 milliGRAM(s) SubCutaneous every 24 hours  indomethacin Suppository 50 milliGRAM(s) Rectal once  piperacillin/tazobactam IVPB.. 3.375 Gram(s) IV Intermittent every 8 hours    MEDICATIONS  (PRN):  acetaminophen     Tablet .. 650 milliGRAM(s) Oral every 6 hours PRN Temp greater or equal to 38C (100.4F), Mild Pain (1 - 3)  aluminum hydroxide/magnesium hydroxide/simethicone Suspension 30 milliLiter(s) Oral every 4 hours PRN Dyspepsia  melatonin 3 milliGRAM(s) Oral at bedtime PRN Insomnia  morphine  - Injectable 2 milliGRAM(s) IV Push every 4 hours PRN Severe Pain (7 - 10)  ondansetron Injectable 4 milliGRAM(s) IV Push every 8 hours PRN Nausea and/or Vomiting      Allergies    No Known Allergies    Intolerances        PAST MEDICAL & SURGICAL HISTORY:  No pertinent past medical history      No significant past surgical history      PHYSICAL EXAM:   Vital Signs:  Vital Signs Last 24 Hrs  T(C): 36.7 (12 May 2023 05:32), Max: 40.4 (11 May 2023 17:04)  T(F): 98 (12 May 2023 05:32), Max: 104.7 (11 May 2023 17:04)  HR: 81 (12 May 2023 05:32) (75 - 109)  BP: 115/70 (12 May 2023 05:32) (112/68 - 159/112)  BP(mean): --  RR: 18 (12 May 2023 05:32) (18 - 19)  SpO2: 96% (12 May 2023 05:32) (96% - 96%)    Parameters below as of 12 May 2023 05:32  Patient On (Oxygen Delivery Method): room air      Daily Height in cm: 157.48 (11 May 2023 14:35)    Daily I&O's Summary      GENERAL:  Appears stated age,  HEENT:  NC/AT,  conjunctivae clear and pink, sclera +icterus  CHEST:  Full & symmetric excursion, no increased effort, breath sounds clear  HEART:  Regular rhythm, S1, S2, no murmur  ABDOMEN:  Soft, tender, non-distended, normoactive bowel sounds  EXTEREMITIES:  no edema  SKIN:  No rash/warm/dry  NEURO:  Alert, oriented,      LABS:                        13.7   19.69 )-----------( 161      ( 12 May 2023 06:14 )             40.2         137  |  102  |  20  ----------------------------<  113<H>  4.1   |  28  |  1.29    Ca    8.0<L>      12 May 2023 06:14  Mg     1.6         TPro  6.3  /  Alb  2.9<L>  /  TBili  4.9<H>  /  DBili  3.5<H>  /  AST  205<H>  /  ALT  272<H>  /  AlkPhos  118        Urinalysis Basic - ( 11 May 2023 16:10 )    Color: Dark Yellow / Appearance: Clear / S.015 / pH: x  Gluc: x / Ketone: Negative mg/dL  / Bili: Small / Urobili: 4.0 mg/dL   Blood: x / Protein: 30 mg/dL / Nitrite: Negative   Leuk Esterase: Negative / RBC: 0 /HPF / WBC 0 /HPF   Sq Epi: x / Non Sq Epi: x / Bacteria: Negative /HPF    Lipase, Serum: 154 U/L ( @ 15:20)    RADIOLOGY & ADDITIONAL TESTS:   INTERVAL HPI/OVERNIGHT EVENTS:  HPI:  PT with less pain overnight in abd... c/o left him pain.... no n/v    61 y/o male admitted with abdominal pain, found to have choledocholithiases. Patient seen and examined.        MEDICATIONS  (STANDING):  amLODIPine   Tablet 5 milliGRAM(s) Oral daily  aspirin enteric coated 81 milliGRAM(s) Oral daily  enoxaparin Injectable 40 milliGRAM(s) SubCutaneous every 24 hours  indomethacin Suppository 50 milliGRAM(s) Rectal once  piperacillin/tazobactam IVPB.. 3.375 Gram(s) IV Intermittent every 8 hours    MEDICATIONS  (PRN):  acetaminophen     Tablet .. 650 milliGRAM(s) Oral every 6 hours PRN Temp greater or equal to 38C (100.4F), Mild Pain (1 - 3)  aluminum hydroxide/magnesium hydroxide/simethicone Suspension 30 milliLiter(s) Oral every 4 hours PRN Dyspepsia  melatonin 3 milliGRAM(s) Oral at bedtime PRN Insomnia  morphine  - Injectable 2 milliGRAM(s) IV Push every 4 hours PRN Severe Pain (7 - 10)  ondansetron Injectable 4 milliGRAM(s) IV Push every 8 hours PRN Nausea and/or Vomiting      Allergies    No Known Allergies    Intolerances        PAST MEDICAL & SURGICAL HISTORY:  No pertinent past medical history      No significant past surgical history      PHYSICAL EXAM:   Vital Signs:  Vital Signs Last 24 Hrs  T(C): 36.7 (12 May 2023 05:32), Max: 40.4 (11 May 2023 17:04)  T(F): 98 (12 May 2023 05:32), Max: 104.7 (11 May 2023 17:04)  HR: 81 (12 May 2023 05:32) (75 - 109)  BP: 115/70 (12 May 2023 05:32) (112/68 - 159/112)  BP(mean): --  RR: 18 (12 May 2023 05:32) (18 - 19)  SpO2: 96% (12 May 2023 05:32) (96% - 96%)    Parameters below as of 12 May 2023 05:32  Patient On (Oxygen Delivery Method): room air      Daily Height in cm: 157.48 (11 May 2023 14:35)    Daily I&O's Summary      GENERAL:  Appears stated age,  HEENT:  NC/AT,  conjunctivae clear and pink, sclera +icterus  CHEST:  Full & symmetric excursion, no increased effort, breath sounds clear  HEART:  Regular rhythm, S1, S2, no murmur  ABDOMEN:  Soft, tender, non-distended, normoactive bowel sounds  EXTEREMITIES:  no edema  SKIN:  No rash/warm/dry  NEURO:  Alert, oriented,      LABS:                        13.7   19.69 )-----------( 161      ( 12 May 2023 06:14 )             40.2         137  |  102  |  20  ----------------------------<  113<H>  4.1   |  28  |  1.29    Ca    8.0<L>      12 May 2023 06:14  Mg     1.6         TPro  6.3  /  Alb  2.9<L>  /  TBili  4.9<H>  /  DBili  3.5<H>  /  AST  205<H>  /  ALT  272<H>  /  AlkPhos  118  12      Urinalysis Basic - ( 11 May 2023 16:10 )    Color: Dark Yellow / Appearance: Clear / S.015 / pH: x  Gluc: x / Ketone: Negative mg/dL  / Bili: Small / Urobili: 4.0 mg/dL   Blood: x / Protein: 30 mg/dL / Nitrite: Negative   Leuk Esterase: Negative / RBC: 0 /HPF / WBC 0 /HPF   Sq Epi: x / Non Sq Epi: x / Bacteria: Negative /HPF    Lipase, Serum: 154 U/L ( @ 15:20)    RADIOLOGY & ADDITIONAL TESTS:

## 2023-05-13 DIAGNOSIS — Z98.890 OTHER SPECIFIED POSTPROCEDURAL STATES: ICD-10-CM

## 2023-05-13 DIAGNOSIS — Z12.11 ENCOUNTER FOR SCREENING FOR MALIGNANT NEOPLASM OF COLON: ICD-10-CM

## 2023-05-13 LAB
ALBUMIN SERPL ELPH-MCNC: 2.7 G/DL — LOW (ref 3.3–5)
ALP SERPL-CCNC: 107 U/L — SIGNIFICANT CHANGE UP (ref 40–120)
ALT FLD-CCNC: 232 U/L — HIGH (ref 10–45)
ANION GAP SERPL CALC-SCNC: 11 MMOL/L — SIGNIFICANT CHANGE UP (ref 5–17)
AST SERPL-CCNC: 117 U/L — HIGH (ref 10–40)
BILIRUB DIRECT SERPL-MCNC: 3.2 MG/DL — HIGH (ref 0–0.3)
BILIRUB INDIRECT FLD-MCNC: 0.8 MG/DL — SIGNIFICANT CHANGE UP (ref 0.2–1)
BILIRUB SERPL-MCNC: 4 MG/DL — HIGH (ref 0.2–1.2)
BUN SERPL-MCNC: 17 MG/DL — SIGNIFICANT CHANGE UP (ref 7–23)
CALCIUM SERPL-MCNC: 8.1 MG/DL — LOW (ref 8.4–10.5)
CHLORIDE SERPL-SCNC: 103 MMOL/L — SIGNIFICANT CHANGE UP (ref 96–108)
CO2 SERPL-SCNC: 22 MMOL/L — SIGNIFICANT CHANGE UP (ref 22–31)
CREAT SERPL-MCNC: 1.01 MG/DL — SIGNIFICANT CHANGE UP (ref 0.5–1.3)
E COLI DNA BLD POS QL NAA+NON-PROBE: SIGNIFICANT CHANGE UP
EGFR: 84 ML/MIN/1.73M2 — SIGNIFICANT CHANGE UP
GLUCOSE SERPL-MCNC: 189 MG/DL — HIGH (ref 70–99)
GRAM STN FLD: SIGNIFICANT CHANGE UP
HCT VFR BLD CALC: 37.5 % — LOW (ref 39–50)
HGB BLD-MCNC: 13 G/DL — SIGNIFICANT CHANGE UP (ref 13–17)
MAGNESIUM SERPL-MCNC: 2 MG/DL — SIGNIFICANT CHANGE UP (ref 1.6–2.6)
MCHC RBC-ENTMCNC: 31.1 PG — SIGNIFICANT CHANGE UP (ref 27–34)
MCHC RBC-ENTMCNC: 34.7 GM/DL — SIGNIFICANT CHANGE UP (ref 32–36)
MCV RBC AUTO: 89.7 FL — SIGNIFICANT CHANGE UP (ref 80–100)
METHOD TYPE: SIGNIFICANT CHANGE UP
NRBC # BLD: 0 /100 WBCS — SIGNIFICANT CHANGE UP (ref 0–0)
PHOSPHATE SERPL-MCNC: 2.5 MG/DL — SIGNIFICANT CHANGE UP (ref 2.5–4.5)
PLATELET # BLD AUTO: 143 K/UL — LOW (ref 150–400)
POTASSIUM SERPL-MCNC: 3.6 MMOL/L — SIGNIFICANT CHANGE UP (ref 3.5–5.3)
POTASSIUM SERPL-SCNC: 3.6 MMOL/L — SIGNIFICANT CHANGE UP (ref 3.5–5.3)
PROT SERPL-MCNC: 6.3 G/DL — SIGNIFICANT CHANGE UP (ref 6–8.3)
RBC # BLD: 4.18 M/UL — LOW (ref 4.2–5.8)
RBC # FLD: 13.8 % — SIGNIFICANT CHANGE UP (ref 10.3–14.5)
SODIUM SERPL-SCNC: 136 MMOL/L — SIGNIFICANT CHANGE UP (ref 135–145)
WBC # BLD: 18.34 K/UL — HIGH (ref 3.8–10.5)
WBC # FLD AUTO: 18.34 K/UL — HIGH (ref 3.8–10.5)

## 2023-05-13 PROCEDURE — 99233 SBSQ HOSP IP/OBS HIGH 50: CPT

## 2023-05-13 PROCEDURE — 99232 SBSQ HOSP IP/OBS MODERATE 35: CPT

## 2023-05-13 RX ADMIN — PIPERACILLIN AND TAZOBACTAM 25 GRAM(S): 4; .5 INJECTION, POWDER, LYOPHILIZED, FOR SOLUTION INTRAVENOUS at 12:32

## 2023-05-13 RX ADMIN — PIPERACILLIN AND TAZOBACTAM 25 GRAM(S): 4; .5 INJECTION, POWDER, LYOPHILIZED, FOR SOLUTION INTRAVENOUS at 03:59

## 2023-05-13 RX ADMIN — ENOXAPARIN SODIUM 40 MILLIGRAM(S): 100 INJECTION SUBCUTANEOUS at 12:32

## 2023-05-13 RX ADMIN — AMLODIPINE BESYLATE 5 MILLIGRAM(S): 2.5 TABLET ORAL at 05:29

## 2023-05-13 RX ADMIN — Medication 650 MILLIGRAM(S): at 22:40

## 2023-05-13 RX ADMIN — Medication 650 MILLIGRAM(S): at 21:36

## 2023-05-13 RX ADMIN — PIPERACILLIN AND TAZOBACTAM 25 GRAM(S): 4; .5 INJECTION, POWDER, LYOPHILIZED, FOR SOLUTION INTRAVENOUS at 18:12

## 2023-05-13 RX ADMIN — Medication 81 MILLIGRAM(S): at 12:32

## 2023-05-13 RX ADMIN — SODIUM CHLORIDE 100 MILLILITER(S): 9 INJECTION, SOLUTION INTRAVENOUS at 04:17

## 2023-05-13 RX ADMIN — Medication 650 MILLIGRAM(S): at 06:29

## 2023-05-13 RX ADMIN — Medication 650 MILLIGRAM(S): at 05:29

## 2023-05-13 NOTE — PROGRESS NOTE ADULT - PROBLEM SELECTOR PLAN 1
LFT's trending down s/p ERCP for choledocholithiasis. Pt denies abd pain, tolerating liq diet.    [ ] con't to trend LFT's  [ ] assess for abd pain, N/V LFT's trending down s/p ERCP for choledocholithiasis. Pt denies abd pain, tolerating liq diet.    con't to trend LFT's  monitor for abd pain, N/V

## 2023-05-13 NOTE — PROGRESS NOTE ADULT - ASSESSMENT
GI examined pt at bedside, daughter Oscar on phone assisting with translation and plan of care.  He denies abd pain, N/V and tolerating liquid diet.  Last BM yesterday per pt, soft.  NO hematochezia/melena.  63 yo man PMHx HTN, presented to ED w/abdominal pain. Had choledocholithiasis. S/P ERCP.  Now denies abd pain, N/V and tolerating liquid diet.  Last BM yesterday per pt, soft.  NO hematochezia/melena.

## 2023-05-13 NOTE — PROGRESS NOTE ADULT - PROBLEM SELECTOR PLAN 2
Pt is s/p ERCP with pancreatic and bile duct stents in setting of choledocholithiasis.  Pt feeling well this am.  Tolerating clear liq diet.  Pt denies abd pain, tolerating liq diet.    [ ] possible CCY  [ ] con't liq diet, advance per surgery  [ ] will need f/u EGD/ERCP as out-pt prior to CCY Pt is s/p ERCP with pancreatic and bile duct stents in setting of choledocholithiasis.  Pt feeling well this am.  Tolerating clear liq diet.  Pt denies abd pain, tolerating liq diet.    surgery following, eventual cholecystectomy.  Continue liquid diet, advance per Surgery recommendation  Will need future GI follow-up for ERCP to remove stent

## 2023-05-13 NOTE — PROGRESS NOTE ADULT - SUBJECTIVE AND OBJECTIVE BOX
Patient is a 62y old  Male who presented with a chief complaint of Abdominal pain . Patient w/u and found to have choledocholithiasis . Patient was consulted by Surgery Dr Green and GI .   Yesterday patient underwent ERCP with sphincterotomy and stent placement .       Patient seen and examined at bedside this am along with Dr Green.   Patient stated that he is feeling better today.   We discussed with patient possibility of laparoscopic cholecystectomy on Monday.   Discussion was translated by patient s daughter who was on the phone with Dr Green.  Patient wanted daughter to translate rather than  phone.   He is Emir[place] speaking.   Patient was placed on clear liquid diet , he will be evaluated in the am by surgery along with GI     ALLERGIES:  No Known Allergies    MEDICATIONS  (STANDING):  amLODIPine   Tablet 5 milliGRAM(s) Oral daily  aspirin enteric coated 81 milliGRAM(s) Oral daily  enoxaparin Injectable 40 milliGRAM(s) SubCutaneous every 24 hours  indomethacin Suppository 50 milliGRAM(s) Rectal once  lactated ringers. 1000 milliLiter(s) (100 mL/Hr) IV Continuous <Continuous>  piperacillin/tazobactam IVPB.. 3.375 Gram(s) IV Intermittent every 8 hours    MEDICATIONS  (PRN):  acetaminophen     Tablet .. 650 milliGRAM(s) Oral every 6 hours PRN Temp greater or equal to 38C (100.4F), Mild Pain (1 - 3)  aluminum hydroxide/magnesium hydroxide/simethicone Suspension 30 milliLiter(s) Oral every 4 hours PRN Dyspepsia  fentaNYL    Injectable 25 MICROGram(s) IV Push every 5 minutes PRN Moderate Pain (4 - 6)  HYDROmorphone  Injectable 0.5 milliGRAM(s) IV Push every 10 minutes PRN Severe Pain (7 - 10)  melatonin 3 milliGRAM(s) Oral at bedtime PRN Insomnia  morphine  - Injectable 2 milliGRAM(s) IV Push every 4 hours PRN Severe Pain (7 - 10)  ondansetron Injectable 4 milliGRAM(s) IV Push every 8 hours PRN Nausea and/or Vomiting  ondansetron Injectable 4 milliGRAM(s) IV Push once PRN Nausea and/or Vomiting    Vital Signs Last 24 Hrs  T(F): 98.2 (13 May 2023 14:31), Max: 98.6 (13 May 2023 05:40)  HR: 72 (13 May 2023 14:31) (72 - 81)  BP: 130/89 (13 May 2023 14:31) (129/92 - 133/95)  RR: 18 (13 May 2023 14:31) (17 - 18)  SpO2: 98% (13 May 2023 14:31) (97% - 98%)  I&O's Summary    12 May 2023 07:01  -  13 May 2023 07:00  --------------------------------------------------------  IN: 0 mL / OUT: 900 mL / NET: -900 mL      PHYSICAL EXAM:  General: NAD, A/O x 3 Emirati speaking male  Neck: Supple, No JVD  Lungs: Clear to auscultation bilaterally  Cardio: RRR, S1/S2, No murmurs  Abdomen: Soft, TTP , Nondistended; Bowel sounds present  Extremities: No calf tenderness, No pitting edema    LABS:                        13.0   18.34 )-----------( 143      ( 13 May 2023 07:45 )             37.5     05-13    136  |  103  |  17  ----------------------------<  189  3.6   |  22  |  1.01    Ca    8.1      13 May 2023 07:45  Phos  2.5     05-13  Mg     2.0     05-13    TPro  6.3  /  Alb  2.7  /  TBili  4.0  /  DBili  3.2  /  AST  117  /  ALT  232  /  AlkPhos  107  05-13                  Urinalysis Basic - ( 11 May 2023 16:10 )    Color: Dark Yellow / Appearance: Clear / S.015 / pH: x  Gluc: x / Ketone: Negative mg/dL  / Bili: Small / Urobili: 4.0 mg/dL   Blood: x / Protein: 30 mg/dL / Nitrite: Negative   Leuk Esterase: Negative / RBC: 0 /HPF / WBC 0 /HPF   Sq Epi: x / Non Sq Epi: x / Bacteria: Negative /HPF        Culture - Blood (collected 11 May 2023 17:00)  Source: .Blood Blood  Gram Stain (13 May 2023 16:40):    Growth in aerobic bottle: Gram Negative Rods  Preliminary Report (13 May 2023 16:41):    Growth in aerobic bottle: Gram Negative Rods    ***Blood Panel PCR results on this specimen are available    approximately 3 hours after the Gram stain result.***    Gram stain, PCR, and/or culture results may not always    correspond due to difference in methodologies.    ************************************************************    This PCR assay was performed by multiplex PCR. This    Assay tests for 66 bacterial and resistance gene targets.    Please refer to the [facility] test directory    at [email or web address] for details.    Culture - Blood (collected 11 May 2023 17:00)  Source: .Blood Blood  Preliminary Report (12 May 2023 22:03):    No growth to date.    Culture - Urine (collected 11 May 2023 16:10)  Source: Clean Catch Clean Catch (Midstream)  Final Report (12 May 2023 16:46):    <10,000 CFU/mL Normal Urogenital Lela          RADIOLOGY & ADDITIONAL TESTS:    Care Discussed with Consultants/Other Providers:

## 2023-05-13 NOTE — PROGRESS NOTE ADULT - ASSESSMENT
61yo male PMH of HTN presenting to Er with cc of abdominal pain.    Acute cholangitis with severe sepsis  Transaminitis  Elevated bilirubin  Obstructing Gallstone in CBD  - LFTs improving this am and WBC elevated but improving   - ERCP yesterday, stent placed  - cont antibiotics  - Start clear liquid diet per GI, advance as tolerated  - F/U surgery recs, possible cholecystectomy   - pain control    HTN  -C/w norvasc 5mg daily, ASA 81mg daily    DVT PPX: Lovenox  AM labs, Full code  DISP: Pending course  [phone] updated daughter at bedside, answered all questions, in agreement with care plan   63yo male PMH of HTN presenting to Er with cc of abdominal pain.    Acute cholangitis with severe sepsis  Transaminitis  Elevated bilirubin  Obstructing Gallstone in CBD  - LFTs improving this am and WBC elevated but improving   - ERCP yesterday, stent placed  - cont antibiotics  - Start clear liquid diet per GI, advance as tolerated  - F/U surgery recs, possible cholecystectomy   - pain control    HTN  -C/w norvasc 5mg daily, ASA 81mg daily    DVT PPX: Lovenox  AM labs, Full code  DISP: Pending course  [phone] updated daughter at bedside, answered all questions, in agreement with care plan   63yo male PMH of HTN presenting to Er with cc of abdominal pain.    Acute cholangitis with severe sepsis  Transaminitis  Elevated bilirubin  Obstructing Gallstone in CBD  - LFTs improving this am and WBC elevated but improving   - ERCP yesterday, stent placed  - cont antibiotics  - Start clear liquid diet per GI, advance as tolerated  - F/U surgery recs, possible cholecystectomy on 5/15/23  - pain control    HTN  -C/w norvasc 5mg daily, ASA 81mg daily    DVT PPX: Lovenox  AM labs, Full code  DISP: Pending course  [phone] updated daughter at bedside, answered all questions, in agreement with care plan

## 2023-05-13 NOTE — PROGRESS NOTE ADULT - SUBJECTIVE AND OBJECTIVE BOX
Patient is a 62y old  Male who presents with a chief complaint of Abdominal pain (12 May 2023 10:12)      Patient seen and examined at bedside. No overnight events reported. Patient states he has some abdominal pain, denies nausea, and vomiting.     ALLERGIES:  No Known Allergies    MEDICATIONS  (STANDING):  amLODIPine   Tablet 5 milliGRAM(s) Oral daily  aspirin enteric coated 81 milliGRAM(s) Oral daily  enoxaparin Injectable 40 milliGRAM(s) SubCutaneous every 24 hours  indomethacin Suppository 50 milliGRAM(s) Rectal once  lactated ringers. 1000 milliLiter(s) (100 mL/Hr) IV Continuous <Continuous>  piperacillin/tazobactam IVPB.. 3.375 Gram(s) IV Intermittent every 8 hours    MEDICATIONS  (PRN):  acetaminophen     Tablet .. 650 milliGRAM(s) Oral every 6 hours PRN Temp greater or equal to 38C (100.4F), Mild Pain (1 - 3)  aluminum hydroxide/magnesium hydroxide/simethicone Suspension 30 milliLiter(s) Oral every 4 hours PRN Dyspepsia  fentaNYL    Injectable 25 MICROGram(s) IV Push every 5 minutes PRN Moderate Pain (4 - 6)  HYDROmorphone  Injectable 0.5 milliGRAM(s) IV Push every 10 minutes PRN Severe Pain (7 - 10)  melatonin 3 milliGRAM(s) Oral at bedtime PRN Insomnia  morphine  - Injectable 2 milliGRAM(s) IV Push every 4 hours PRN Severe Pain (7 - 10)  ondansetron Injectable 4 milliGRAM(s) IV Push once PRN Nausea and/or Vomiting  ondansetron Injectable 4 milliGRAM(s) IV Push every 8 hours PRN Nausea and/or Vomiting    Vital Signs Last 24 Hrs  T(F): 98.6 (13 May 2023 05:40), Max: 98.6 (13 May 2023 05:40)  HR: 76 (13 May 2023 05:40) (76 - 81)  BP: 133/95 (13 May 2023 05:40) (129/92 - 133/95)  RR: 18 (13 May 2023 05:40) (17 - 18)  SpO2: 97% (13 May 2023 05:40) (96% - 97%)  I&O's Summary    12 May 2023 07:01  -  13 May 2023 07:00  --------------------------------------------------------  IN: 0 mL / OUT: 900 mL / NET: -900 mL      PHYSICAL EXAM:  General: NAD, A/O x 3  ENT: No gross hearing impairment, Moist mucous membranes, no thrush  Neck: Supple, No JVD  Lungs: Clear to auscultation bilaterally, good air entry, non-labored breathing  Cardio: RRR, S1/S2, No murmur  Abdomen: Soft, tenderness to epigastric, Nondistended; Bowel sounds present  Extremities: No calf tenderness, No cyanosis, No pitting edema  Psych: Appropriate mood and affect    LABS:                        13.0   18.34 )-----------( 143      ( 13 May 2023 07:45 )             37.5     05-13    136  |  103  |  17  ----------------------------<  189  3.6   |  22  |  1.01    Ca    8.1      13 May 2023 07:45  Phos  2.5     -  Mg     2.0     -    TPro  6.3  /  Alb  2.7  /  TBili  4.0  /  DBili  3.2  /  AST  117  /  ALT  232  /  AlkPhos  107  05-      Lipase, Serum: 154 U/L (23 @ 15:20)                                  Urinalysis Basic - ( 11 May 2023 16:10 )    Color: Dark Yellow / Appearance: Clear / S.015 / pH: x  Gluc: x / Ketone: Negative mg/dL  / Bili: Small / Urobili: 4.0 mg/dL   Blood: x / Protein: 30 mg/dL / Nitrite: Negative   Leuk Esterase: Negative / RBC: 0 /HPF / WBC 0 /HPF   Sq Epi: x / Non Sq Epi: x / Bacteria: Negative /HPF        Culture - Blood (collected 11 May 2023 17:00)  Source: .Blood Blood  Preliminary Report (12 May 2023 22:03):    No growth to date.    Culture - Blood (collected 11 May 2023 17:00)  Source: .Blood Blood  Preliminary Report (12 May 2023 22:03):    No growth to date.    Culture - Urine (collected 11 May 2023 16:10)  Source: Clean Catch Clean Catch (Midstream)  Final Report (12 May 2023 16:46):    <10,000 CFU/mL Normal Urogenital Lela        RADIOLOGY & ADDITIONAL TESTS:< from: ERCP (23 @ 00:00) >    ERCP Findings:        The duodenoscope was passed into the second portion of the duodenum. The major    papilla could not identified so grasper and tome used to manipulate    brigitte-diverticular area until it was found at left aspect inside diverticulum and    appeared unremarkable,with no bile flow.  Two endoscopic clips were used to    roll mucosa out of the diverticulum and allow visualization of the ampulla.    Using a short-tipped traction sphincterotome, the bile duct was unable to be    initially cannulated but the pancreas was visualized. A wire was passed into the    pancreas and a 5 fr 3 cm stent placed in to panc duct. This blocked bile duct    access and was repositioned, eventually falling out.  A precut sphincterotomy    was performed with some oozing with a microknife.  The bile duct was then able    to be deeply cannulated and a cholangiogram was produced by injecting contrast    demonstrating multiple bile duct stones in a dilated main duct. The bleeding    picked up but slowed with balloon tamponade.  To help control bleed and    facilitate future therapy, a 40 x 10 mm fully covered 10mm stent was then placed    with the end outside of the diverticulum with spontaneous passage of a yellow    stone and cessation of bleeding.  Post procedure films didn't show any evidence    of free air and good stent placement.        Fluoroscopic Interpretation:        I supervised the acquisition and interpretation of the fluoroscopic images at    the biliary tree. The quality of the images was good.        Impressions:        Diverticula of small intestine.        Choledocholithiasis.    < end of copied text >      Care Discussed with Consultants/Other Providers:

## 2023-05-13 NOTE — PROGRESS NOTE ADULT - SUBJECTIVE AND OBJECTIVE BOX
INTERVAL HPI/OVERNIGHT EVENTS:  HPI:  61yo male PMH of HTN presenting to Er with cc of abdominal pain.  Patient stated that his pain started acutely in the middle of his abdomen since 9AM.  He stated he drank some fer hoping that it would get better but it progressively worsened.  At 1pm he started to vomit and his pain traveled to his RUQ.  He decided to come to the ER for further evaluation.  He denies fever, cough, chest pain, Travel hx, diarrhea.  He has never had a colonoscopy in the past.      23:  GI examined pt at bedside, daughter Oscar on phone assisting with translation and plan of care.  He denies abd pain, N/V and tolerating liquid diet.      MEDICATIONS  (STANDING):  amLODIPine   Tablet 5 milliGRAM(s) Oral daily  aspirin enteric coated 81 milliGRAM(s) Oral daily  enoxaparin Injectable 40 milliGRAM(s) SubCutaneous every 24 hours  indomethacin Suppository 50 milliGRAM(s) Rectal once  lactated ringers. 1000 milliLiter(s) (100 mL/Hr) IV Continuous <Continuous>  piperacillin/tazobactam IVPB.. 3.375 Gram(s) IV Intermittent every 8 hours    MEDICATIONS  (PRN):  acetaminophen     Tablet .. 650 milliGRAM(s) Oral every 6 hours PRN Temp greater or equal to 38C (100.4F), Mild Pain (1 - 3)  aluminum hydroxide/magnesium hydroxide/simethicone Suspension 30 milliLiter(s) Oral every 4 hours PRN Dyspepsia  fentaNYL    Injectable 25 MICROGram(s) IV Push every 5 minutes PRN Moderate Pain (4 - 6)  HYDROmorphone  Injectable 0.5 milliGRAM(s) IV Push every 10 minutes PRN Severe Pain (7 - 10)  melatonin 3 milliGRAM(s) Oral at bedtime PRN Insomnia  morphine  - Injectable 2 milliGRAM(s) IV Push every 4 hours PRN Severe Pain (7 - 10)  ondansetron Injectable 4 milliGRAM(s) IV Push once PRN Nausea and/or Vomiting  ondansetron Injectable 4 milliGRAM(s) IV Push every 8 hours PRN Nausea and/or Vomiting      Allergies    No Known Allergies    Intolerances        PAST MEDICAL & SURGICAL HISTORY:  No pertinent past medical history      No significant past surgical history      REVIEW OF SYSTEMS      PHYSICAL EXAM:   Vital Signs:  Vital Signs Last 24 Hrs  T(C): 37 (13 May 2023 05:40), Max: 37 (13 May 2023 05:40)  T(F): 98.6 (13 May 2023 05:40), Max: 98.6 (13 May 2023 05:40)  HR: 76 (13 May 2023 05:40) (76 - 81)  BP: 133/95 (13 May 2023 05:40) (129/92 - 133/95)  BP(mean): --  RR: 18 (13 May 2023 05:40) (17 - 18)  SpO2: 97% (13 May 2023 05:40) (96% - 97%)    Parameters below as of 13 May 2023 05:40  Patient On (Oxygen Delivery Method): room air      Daily Height in cm: 162.56 (12 May 2023 21:04)    Daily I&O's Summary    12 May 2023 07:01  -  13 May 2023 07:00  --------------------------------------------------------  IN: 0 mL / OUT: 900 mL / NET: -900 mL        GENERAL:  Appears stated age,  no distress  HEENT:  NC/AT,  conjunctivae clear and pink, , sclera -anicteric  CHEST:  Full & symmetric excursion, no increased effort, breath sounds clear  HEART:  Regular rhythm, S1, S2, no edema  ABDOMEN:  Soft, non-tender, non-distended, normoactive bowel sounds  EXTEREMITIES:  no cyanosis,clubbing or edema  SKIN:  No rash/warm/dry  NEURO:  Alert, oriented      LABS:                        13.0   18.34 )-----------( 143      ( 13 May 2023 07:45 )             37.5     05-13    136  |  103  |  17  ----------------------------<  189<H>  3.6   |  22  |  1.01    Ca    8.1<L>      13 May 2023 07:45  Phos  2.5     05-13  Mg     2.0     05-13    TPro  6.3  /  Alb  2.7<L>  /  TBili  4.0<H>  /  DBili  3.2<H>  /  AST  117<H>  /  ALT  232<H>  /  AlkPhos  107  05-13      Urinalysis Basic - ( 11 May 2023 16:10 )    Color: Dark Yellow / Appearance: Clear / S.015 / pH: x  Gluc: x / Ketone: Negative mg/dL  / Bili: Small / Urobili: 4.0 mg/dL   Blood: x / Protein: 30 mg/dL / Nitrite: Negative   Leuk Esterase: Negative / RBC: 0 /HPF / WBC 0 /HPF   Sq Epi: x / Non Sq Epi: x / Bacteria: Negative /HPF      amylase   lipaseLipase, Serum: 154 U/L ( @ 15:20)    RADIOLOGY & ADDITIONAL TESTS:   Examined pt at bedside, daughter Sangita on phone assisting with translation and plan of care.  S/P ERCP.  Patient reports feeling well. He denies abd pain, N/V and is tolerating liquid diet.            MEDICATIONS  (STANDING):  amLODIPine   Tablet 5 milliGRAM(s) Oral daily  aspirin enteric coated 81 milliGRAM(s) Oral daily  enoxaparin Injectable 40 milliGRAM(s) SubCutaneous every 24 hours  indomethacin Suppository 50 milliGRAM(s) Rectal once (ERCP)  lactated ringers. 1000 milliLiter(s) (100 mL/Hr) IV Continuous <Continuous>  piperacillin/tazobactam IVPB.. 3.375 Gram(s) IV Intermittent every 8 hours    MEDICATIONS  (PRN):  acetaminophen     Tablet .. 650 milliGRAM(s) Oral every 6 hours PRN Temp greater or equal to 38C (100.4F), Mild Pain (1 - 3)  aluminum hydroxide/magnesium hydroxide/simethicone Suspension 30 milliLiter(s) Oral every 4 hours PRN Dyspepsia  fentaNYL    Injectable 25 MICROGram(s) IV Push every 5 minutes PRN Moderate Pain (4 - 6)  HYDROmorphone  Injectable 0.5 milliGRAM(s) IV Push every 10 minutes PRN Severe Pain (7 - 10)  melatonin 3 milliGRAM(s) Oral at bedtime PRN Insomnia  morphine  - Injectable 2 milliGRAM(s) IV Push every 4 hours PRN Severe Pain (7 - 10)  ondansetron Injectable 4 milliGRAM(s) IV Push once PRN Nausea and/or Vomiting  ondansetron Injectable 4 milliGRAM(s) IV Push every 8 hours PRN Nausea and/or Vomiting      Allergies    No Known Allergies    Intolerances      PHYSICAL EXAM:   Vital Signs:  Vital Signs Last 24 Hrs  T(C): 37 (13 May 2023 05:40), Max: 37 (13 May 2023 05:40)  T(F): 98.6 (13 May 2023 05:40), Max: 98.6 (13 May 2023 05:40)  HR: 76 (13 May 2023 05:40) (76 - 81)  BP: 133/95 (13 May 2023 05:40) (129/92 - 133/95)  BP(mean): --  RR: 18 (13 May 2023 05:40) (17 - 18)  SpO2: 97% (13 May 2023 05:40) (96% - 97%)    Parameters below as of 13 May 2023 05:40  Patient On (Oxygen Delivery Method): room air      Daily Height in cm: 162.56 (12 May 2023 21:04)    Daily I&O's Summary    12 May 2023 07:01  -  13 May 2023 07:00  --------------------------------------------------------  IN: 0 mL / OUT: 900 mL / NET: -900 mL        GENERAL:  Appears stated age,  no distress  HEENT:  NC/AT, sclera anicteric  CHEST: clear  HEART:  Regular rhythm, S1, S2, no edema  ABDOMEN:  Soft, non-tender, non-distended, normoactive bowel sounds  EXTREMITIES:  no edema  SKIN:  No rash or jaundice  NEURO:  Alert, oriented      LABS:                        13.0   18.34 )-----------( 143      ( 13 May 2023 07:45 )             37.5     05-13    136  |  103  |  17  ----------------------------<  189<H>  3.6   |  22  |  1.01    Ca    8.1<L>      13 May 2023 07:45  Phos  2.5       Mg     2.0         TPro  6.3  /  Alb  2.7<L>  /  TBili  4.0<H>  /  DBili  3.2<H>  /  AST  117<H>  /  ALT  232<H>  /  AlkPhos  107  -    Bilirubin Total, Serum: 4.0 mg/dL (23 @ 07:45)   Bilirubin Total, Serum: 4.9 mg/dL (23 @ 06:14)     Alkaline Phosphatase, Serum: 107 U/L (23 @ 07:45)   Alkaline Phosphatase, Serum: 118 U/L (23 @ 06:14)       Urinalysis Basic - ( 11 May 2023 16:10 )    Color: Dark Yellow / Appearance: Clear / S.015 / pH: x  Gluc: x / Ketone: Negative mg/dL  / Bili: Small / Urobili: 4.0 mg/dL   Blood: x / Protein: 30 mg/dL / Nitrite: Negative   Leuk Esterase: Negative / RBC: 0 /HPF / WBC 0 /HPF   Sq Epi: x / Non Sq Epi: x / Bacteria: Negative /HPF    Lipase, Serum: 154 U/L ( @ 15:20)

## 2023-05-13 NOTE — PROGRESS NOTE ADULT - PROBLEM SELECTOR PLAN 3
Pt has never had CRC screening.    [ ] will need colonoscopy as out-pt eventual colonoscopy as outpt

## 2023-05-13 NOTE — PROGRESS NOTE ADULT - ASSESSMENT
Stable post ERCP    Plan:   clear liquid diet             check labs in am             Re evaluate in am            Possible laparoscopic cholecystectomy on Monday Dr Green discussed plan with patient and daughter

## 2023-05-14 ENCOUNTER — TRANSCRIPTION ENCOUNTER (OUTPATIENT)
Age: 63
End: 2023-05-14

## 2023-05-14 LAB
ALBUMIN SERPL ELPH-MCNC: 2.7 G/DL — LOW (ref 3.3–5)
ALP SERPL-CCNC: 111 U/L — SIGNIFICANT CHANGE UP (ref 40–120)
ALT FLD-CCNC: 179 U/L — HIGH (ref 10–45)
ANION GAP SERPL CALC-SCNC: 10 MMOL/L — SIGNIFICANT CHANGE UP (ref 5–17)
AST SERPL-CCNC: 57 U/L — HIGH (ref 10–40)
BILIRUB SERPL-MCNC: 1.4 MG/DL — HIGH (ref 0.2–1.2)
BUN SERPL-MCNC: 11 MG/DL — SIGNIFICANT CHANGE UP (ref 7–23)
CALCIUM SERPL-MCNC: 8.2 MG/DL — LOW (ref 8.4–10.5)
CHLORIDE SERPL-SCNC: 105 MMOL/L — SIGNIFICANT CHANGE UP (ref 96–108)
CO2 SERPL-SCNC: 24 MMOL/L — SIGNIFICANT CHANGE UP (ref 22–31)
CREAT SERPL-MCNC: 0.79 MG/DL — SIGNIFICANT CHANGE UP (ref 0.5–1.3)
EGFR: 100 ML/MIN/1.73M2 — SIGNIFICANT CHANGE UP
GLUCOSE SERPL-MCNC: 117 MG/DL — HIGH (ref 70–99)
HCT VFR BLD CALC: 39.8 % — SIGNIFICANT CHANGE UP (ref 39–50)
HGB BLD-MCNC: 13.5 G/DL — SIGNIFICANT CHANGE UP (ref 13–17)
LIDOCAIN IGE QN: 2685 U/L — HIGH (ref 73–393)
MCHC RBC-ENTMCNC: 30.8 PG — SIGNIFICANT CHANGE UP (ref 27–34)
MCHC RBC-ENTMCNC: 33.9 GM/DL — SIGNIFICANT CHANGE UP (ref 32–36)
MCV RBC AUTO: 90.9 FL — SIGNIFICANT CHANGE UP (ref 80–100)
NRBC # BLD: 0 /100 WBCS — SIGNIFICANT CHANGE UP (ref 0–0)
PLATELET # BLD AUTO: 155 K/UL — SIGNIFICANT CHANGE UP (ref 150–400)
POTASSIUM SERPL-MCNC: 3.6 MMOL/L — SIGNIFICANT CHANGE UP (ref 3.5–5.3)
POTASSIUM SERPL-SCNC: 3.6 MMOL/L — SIGNIFICANT CHANGE UP (ref 3.5–5.3)
PROT SERPL-MCNC: 6.4 G/DL — SIGNIFICANT CHANGE UP (ref 6–8.3)
RBC # BLD: 4.38 M/UL — SIGNIFICANT CHANGE UP (ref 4.2–5.8)
RBC # FLD: 14 % — SIGNIFICANT CHANGE UP (ref 10.3–14.5)
SODIUM SERPL-SCNC: 139 MMOL/L — SIGNIFICANT CHANGE UP (ref 135–145)
WBC # BLD: 12.46 K/UL — HIGH (ref 3.8–10.5)
WBC # FLD AUTO: 12.46 K/UL — HIGH (ref 3.8–10.5)

## 2023-05-14 PROCEDURE — 99233 SBSQ HOSP IP/OBS HIGH 50: CPT

## 2023-05-14 PROCEDURE — 99232 SBSQ HOSP IP/OBS MODERATE 35: CPT

## 2023-05-14 PROCEDURE — 99232 SBSQ HOSP IP/OBS MODERATE 35: CPT | Mod: 57

## 2023-05-14 RX ADMIN — AMLODIPINE BESYLATE 5 MILLIGRAM(S): 2.5 TABLET ORAL at 05:32

## 2023-05-14 RX ADMIN — MORPHINE SULFATE 2 MILLIGRAM(S): 50 CAPSULE, EXTENDED RELEASE ORAL at 20:11

## 2023-05-14 RX ADMIN — MORPHINE SULFATE 2 MILLIGRAM(S): 50 CAPSULE, EXTENDED RELEASE ORAL at 15:06

## 2023-05-14 RX ADMIN — Medication 81 MILLIGRAM(S): at 12:21

## 2023-05-14 RX ADMIN — PIPERACILLIN AND TAZOBACTAM 25 GRAM(S): 4; .5 INJECTION, POWDER, LYOPHILIZED, FOR SOLUTION INTRAVENOUS at 17:45

## 2023-05-14 RX ADMIN — HYDROMORPHONE HYDROCHLORIDE 0.5 MILLIGRAM(S): 2 INJECTION INTRAMUSCULAR; INTRAVENOUS; SUBCUTANEOUS at 06:33

## 2023-05-14 RX ADMIN — PIPERACILLIN AND TAZOBACTAM 25 GRAM(S): 4; .5 INJECTION, POWDER, LYOPHILIZED, FOR SOLUTION INTRAVENOUS at 10:21

## 2023-05-14 RX ADMIN — HYDROMORPHONE HYDROCHLORIDE 0.5 MILLIGRAM(S): 2 INJECTION INTRAMUSCULAR; INTRAVENOUS; SUBCUTANEOUS at 01:38

## 2023-05-14 RX ADMIN — MORPHINE SULFATE 2 MILLIGRAM(S): 50 CAPSULE, EXTENDED RELEASE ORAL at 10:21

## 2023-05-14 RX ADMIN — MORPHINE SULFATE 2 MILLIGRAM(S): 50 CAPSULE, EXTENDED RELEASE ORAL at 15:36

## 2023-05-14 RX ADMIN — MORPHINE SULFATE 2 MILLIGRAM(S): 50 CAPSULE, EXTENDED RELEASE ORAL at 20:41

## 2023-05-14 RX ADMIN — MORPHINE SULFATE 2 MILLIGRAM(S): 50 CAPSULE, EXTENDED RELEASE ORAL at 10:51

## 2023-05-14 RX ADMIN — PIPERACILLIN AND TAZOBACTAM 25 GRAM(S): 4; .5 INJECTION, POWDER, LYOPHILIZED, FOR SOLUTION INTRAVENOUS at 01:45

## 2023-05-14 RX ADMIN — ENOXAPARIN SODIUM 40 MILLIGRAM(S): 100 INJECTION SUBCUTANEOUS at 12:21

## 2023-05-14 RX ADMIN — HYDROMORPHONE HYDROCHLORIDE 0.5 MILLIGRAM(S): 2 INJECTION INTRAMUSCULAR; INTRAVENOUS; SUBCUTANEOUS at 00:35

## 2023-05-14 RX ADMIN — HYDROMORPHONE HYDROCHLORIDE 0.5 MILLIGRAM(S): 2 INJECTION INTRAMUSCULAR; INTRAVENOUS; SUBCUTANEOUS at 05:55

## 2023-05-14 NOTE — PROGRESS NOTE ADULT - PROBLEM SELECTOR PROBLEM 2
Status post endoscopic retrograde cholangiopancreatography
Dilated cbd, acquired
Status post endoscopic retrograde cholangiopancreatography

## 2023-05-14 NOTE — PROGRESS NOTE ADULT - ASSESSMENT
63yo male PMH of HTN presenting to Er with cc of abdominal pain.    Acute cholangitis with severe sepsis  Transaminitis  Elevated bilirubin  Obstructing Gallstone in CBD  - LFTs improving this am and WBC elevated but improving   - ERCP 5/12, stent placed  - cont antibiotics  - Continue clear liquid diet per GI  - NPO after midnight for OR tmrw  - surgery recs, cholecystectomy on 5/15/23  - pain control    HTN  -C/w norvasc 5mg daily, ASA 81mg daily    DVT PPX: Lovenox, will hold tonight/tmrw for OR  AM labs, Full code  DISP: Pending course  [phone] updated daughter, answered all questions, in agreement with care plan   61yo male PMH of HTN presenting to Er with cc of abdominal pain.    Acute cholangitis with severe sepsis  Transaminitis  Elevated bilirubin  Obstructing Gallstone in CBD  - LFTs improving this am and WBC elevated but improving   - ERCP 5/12, stent placed  - cont antibiotics  - Continue clear liquid diet per GI  - NPO after midnight for OR tmrw  - surgery recs, cholecystectomy on 5/15/23  - pain control    HTN  -C/w norvasc 5mg daily, ASA 81mg daily, but will hold ASA for OR tmrw    DVT PPX: Lovenox, will hold tonight/tmrw for OR  AM labs, Full code  DISP: Pending course  [phone] updated daughter, answered all questions, in agreement with care plan

## 2023-05-14 NOTE — PROGRESS NOTE ADULT - SUBJECTIVE AND OBJECTIVE BOX
The patient still has epigastric discomfort - improved  Afebrile, WBC is still elevated  Tolerating PO fluids  Abdomen - nontender in RUQ  LFT's and bilirubin elevated - awaiting repeat    PLAN: LAP GB tomorrow           Repeat WBC, LFT's

## 2023-05-14 NOTE — PROGRESS NOTE ADULT - SUBJECTIVE AND OBJECTIVE BOX
Patient is a 62y old  Male who presents with a chief complaint of Abdominal pain (14 May 2023 08:44)      Patient seen and examined at bedside. No overnight events reported. Patient states he has some epigastric pain, but denies nausea, vomiting, sob, chest pain.     ALLERGIES:  No Known Allergies    MEDICATIONS  (STANDING):  amLODIPine   Tablet 5 milliGRAM(s) Oral daily  aspirin enteric coated 81 milliGRAM(s) Oral daily  enoxaparin Injectable 40 milliGRAM(s) SubCutaneous every 24 hours  indomethacin Suppository 50 milliGRAM(s) Rectal once  lactated ringers. 1000 milliLiter(s) (100 mL/Hr) IV Continuous <Continuous>  piperacillin/tazobactam IVPB.. 3.375 Gram(s) IV Intermittent every 8 hours    MEDICATIONS  (PRN):  acetaminophen     Tablet .. 650 milliGRAM(s) Oral every 6 hours PRN Temp greater or equal to 38C (100.4F), Mild Pain (1 - 3)  aluminum hydroxide/magnesium hydroxide/simethicone Suspension 30 milliLiter(s) Oral every 4 hours PRN Dyspepsia  fentaNYL    Injectable 25 MICROGram(s) IV Push every 5 minutes PRN Moderate Pain (4 - 6)  HYDROmorphone  Injectable 0.5 milliGRAM(s) IV Push every 10 minutes PRN Severe Pain (7 - 10)  melatonin 3 milliGRAM(s) Oral at bedtime PRN Insomnia  morphine  - Injectable 2 milliGRAM(s) IV Push every 4 hours PRN Severe Pain (7 - 10)  ondansetron Injectable 4 milliGRAM(s) IV Push every 8 hours PRN Nausea and/or Vomiting  ondansetron Injectable 4 milliGRAM(s) IV Push once PRN Nausea and/or Vomiting    Vital Signs Last 24 Hrs  T(F): 98.2 (14 May 2023 05:25), Max: 98.2 (13 May 2023 14:31)  HR: 75 (14 May 2023 05:25) (72 - 82)  BP: 145/95 (14 May 2023 05:25) (128/87 - 145/95)  RR: 20 (14 May 2023 05:25) (18 - 20)  SpO2: 96% (14 May 2023 05:25) (96% - 98%)  I&O's Summary    13 May 2023 07:01  -  14 May 2023 07:00  --------------------------------------------------------  IN: 300 mL / OUT: 0 mL / NET: 300 mL    14 May 2023 07:01  -  14 May 2023 09:16  --------------------------------------------------------  IN: 0 mL / OUT: 600 mL / NET: -600 mL      PHYSICAL EXAM:  General: NAD, A/O x 3  ENT: No gross hearing impairment, Moist mucous membranes, no thrush  Neck: Supple, No JVD  Lungs: Clear to auscultation bilaterally, good air entry, non-labored breathing  Cardio: RRR, S1/S2, No murmur  Abdomen: Soft, tender to epigastric region, Nondistended; Bowel sounds present  Extremities: No calf tenderness, No cyanosis, No pitting edema  Psych: Appropriate mood and affect    LABS:                        13.5   12.46 )-----------( 155      ( 14 May 2023 06:00 )             39.8     05-14    139  |  105  |  11  ----------------------------<  117  3.6   |  24  |  0.79    Ca    8.2      14 May 2023 06:00  Phos  2.5     -  Mg     2.0         TPro  6.4  /  Alb  2.7  /  TBili  1.4  /  DBili  x   /  AST  57  /  ALT  179  /  AlkPhos  111        Lipase, Serum: 154 U/L (23 @ 15:20)                                  Urinalysis Basic - ( 11 May 2023 16:10 )    Color: Dark Yellow / Appearance: Clear / S.015 / pH: x  Gluc: x / Ketone: Negative mg/dL  / Bili: Small / Urobili: 4.0 mg/dL   Blood: x / Protein: 30 mg/dL / Nitrite: Negative   Leuk Esterase: Negative / RBC: 0 /HPF / WBC 0 /HPF   Sq Epi: x / Non Sq Epi: x / Bacteria: Negative /HPF        Culture - Blood (collected 11 May 2023 17:00)  Source: .Blood Blood  Gram Stain (13 May 2023 16:40):    Growth in aerobic bottle: Gram Negative Rods  Preliminary Report (13 May 2023 16:41):    Growth in aerobic bottle: Gram Negative Rods    ***Blood Panel PCR results on this specimen are available    approximately 3 hours after the Gram stain result.***    Gram stain, PCR, and/or culture results may not always    correspond due to difference in methodologies.    ************************************************************    This PCR assay was performed by multiplex PCR. This    Assay tests for 66 bacterial and resistance gene targets.    Please refer to the [facility] test directory    at [email or web address] for details.  Organism: Blood Culture PCR (13 May 2023 19:10)  Organism: Blood Culture PCR (13 May 2023 19:10)      Method Type: PCR      -  Escherichia coli: Detec    Culture - Blood (collected 11 May 2023 17:00)  Source: .Blood Blood  Preliminary Report (12 May 2023 22:03):    No growth to date.    Culture - Urine (collected 11 May 2023 16:10)  Source: Clean Catch Clean Catch (Midstream)  Final Report (12 May 2023 16:46):    <10,000 CFU/mL Normal Urogenital Lela        RADIOLOGY & ADDITIONAL TESTS:    Care Discussed with Consultants/Other Providers: Discussed plan with Dr. Green   Patient is a 62y old  Male who presents with a chief complaint of Abdominal pain (14 May 2023 08:44)    Feels okay.  Still with abdominal pain.      Patient seen and examined at bedside. No overnight events reported. Patient states he has some epigastric pain, but denies nausea, vomiting, sob, chest pain.     ALLERGIES:  No Known Allergies    MEDICATIONS  (STANDING):  amLODIPine   Tablet 5 milliGRAM(s) Oral daily  aspirin enteric coated 81 milliGRAM(s) Oral daily  enoxaparin Injectable 40 milliGRAM(s) SubCutaneous every 24 hours  indomethacin Suppository 50 milliGRAM(s) Rectal once  lactated ringers. 1000 milliLiter(s) (100 mL/Hr) IV Continuous <Continuous>  piperacillin/tazobactam IVPB.. 3.375 Gram(s) IV Intermittent every 8 hours    MEDICATIONS  (PRN):  acetaminophen     Tablet .. 650 milliGRAM(s) Oral every 6 hours PRN Temp greater or equal to 38C (100.4F), Mild Pain (1 - 3)  aluminum hydroxide/magnesium hydroxide/simethicone Suspension 30 milliLiter(s) Oral every 4 hours PRN Dyspepsia  fentaNYL    Injectable 25 MICROGram(s) IV Push every 5 minutes PRN Moderate Pain (4 - 6)  HYDROmorphone  Injectable 0.5 milliGRAM(s) IV Push every 10 minutes PRN Severe Pain (7 - 10)  melatonin 3 milliGRAM(s) Oral at bedtime PRN Insomnia  morphine  - Injectable 2 milliGRAM(s) IV Push every 4 hours PRN Severe Pain (7 - 10)  ondansetron Injectable 4 milliGRAM(s) IV Push every 8 hours PRN Nausea and/or Vomiting  ondansetron Injectable 4 milliGRAM(s) IV Push once PRN Nausea and/or Vomiting    Vital Signs Last 24 Hrs  T(F): 98.2 (14 May 2023 05:25), Max: 98.2 (13 May 2023 14:31)  HR: 75 (14 May 2023 05:25) (72 - 82)  BP: 145/95 (14 May 2023 05:25) (128/87 - 145/95)  RR: 20 (14 May 2023 05:25) (18 - 20)  SpO2: 96% (14 May 2023 05:25) (96% - 98%)  I&O's Summary    13 May 2023 07:01  -  14 May 2023 07:00  --------------------------------------------------------  IN: 300 mL / OUT: 0 mL / NET: 300 mL    14 May 2023 07:01  -  14 May 2023 09:16  --------------------------------------------------------  IN: 0 mL / OUT: 600 mL / NET: -600 mL      PHYSICAL EXAM:  General: NAD, A/O x 3  ENT: No gross hearing impairment, Moist mucous membranes, no thrush  Neck: Supple, No JVD  Lungs: Clear to auscultation bilaterally, good air entry, non-labored breathing  Cardio: RRR, S1/S2, No murmur  Abdomen: Soft, tender to epigastric region, Nondistended; Bowel sounds present  Extremities: No calf tenderness, No cyanosis, No pitting edema  Psych: Appropriate mood and affect    LABS:                        13.5   12.46 )-----------( 155      ( 14 May 2023 06:00 )             39.8         139  |  105  |  11  ----------------------------<  117  3.6   |  24  |  0.79    Ca    8.2      14 May 2023 06:00  Phos  2.5     -  Mg     2.0         TPro  6.4  /  Alb  2.7  /  TBili  1.4  /  DBili  x   /  AST  57  /  ALT  179  /  AlkPhos  111      Lipase, Serum: 154 U/L (23 @ 15:20)    Urinalysis Basic - ( 11 May 2023 16:10 )    Color: Dark Yellow / Appearance: Clear / S.015 / pH: x  Gluc: x / Ketone: Negative mg/dL  / Bili: Small / Urobili: 4.0 mg/dL   Blood: x / Protein: 30 mg/dL / Nitrite: Negative   Leuk Esterase: Negative / RBC: 0 /HPF / WBC 0 /HPF   Sq Epi: x / Non Sq Epi: x / Bacteria: Negative /HPF    Culture - Blood (collected 11 May 2023 17:00)  Source: .Blood Blood  Gram Stain (13 May 2023 16:40):    Growth in aerobic bottle: Gram Negative Rods  Preliminary Report (13 May 2023 16:41):    Growth in aerobic bottle: Gram Negative Rods    ***Blood Panel PCR results on this specimen are available    approximately 3 hours after the Gram stain result.***    Gram stain, PCR, and/or culture results may not always    correspond due to difference in methodologies.    ************************************************************    This PCR assay was performed by multiplex PCR. This    Assay tests for 66 bacterial and resistance gene targets.    Please refer to the [facility] test directory    at [email or web address] for details.  Organism: Blood Culture PCR (13 May 2023 19:10)  Organism: Blood Culture PCR (13 May 2023 19:10)      Method Type: PCR      -  Escherichia coli: Detec    Culture - Blood (collected 11 May 2023 17:00)  Source: .Blood Blood  Preliminary Report (12 May 2023 22:03):    No growth to date.    Culture - Urine (collected 11 May 2023 16:10)  Source: Clean Catch Clean Catch (Midstream)  Final Report (12 May 2023 16:46):    <10,000 CFU/mL Normal Urogenital Lela        RADIOLOGY & ADDITIONAL TESTS:    Care Discussed with Consultants/Other Providers: Discussed plan with Dr. Green

## 2023-05-14 NOTE — PROGRESS NOTE ADULT - PROBLEM SELECTOR PLAN 2
Surgery note appreciated. Plan for cholecystectomy.  Continue liquid diet, advance per Surgery recommendation  Will need future GI follow-up for ERCP to remove stent.

## 2023-05-14 NOTE — PROGRESS NOTE ADULT - SUBJECTIVE AND OBJECTIVE BOX
Patient seen and assessed at bedside. C/o focal mild epigastric discomfort. Denies nausea, vomiting, rectal bleeding, melena.      MEDICATIONS  (STANDING):  amLODIPine   Tablet 5 milliGRAM(s) Oral daily  aspirin enteric coated 81 milliGRAM(s) Oral daily  enoxaparin Injectable 40 milliGRAM(s) SubCutaneous every 24 hours  indomethacin Suppository 50 milliGRAM(s) Rectal once  lactated ringers. 1000 milliLiter(s) (100 mL/Hr) IV Continuous <Continuous>  piperacillin/tazobactam IVPB.. 3.375 Gram(s) IV Intermittent every 8 hours    MEDICATIONS  (PRN):  acetaminophen     Tablet .. 650 milliGRAM(s) Oral every 6 hours PRN Temp greater or equal to 38C (100.4F), Mild Pain (1 - 3)  aluminum hydroxide/magnesium hydroxide/simethicone Suspension 30 milliLiter(s) Oral every 4 hours PRN Dyspepsia  fentaNYL    Injectable 25 MICROGram(s) IV Push every 5 minutes PRN Moderate Pain (4 - 6)  HYDROmorphone  Injectable 0.5 milliGRAM(s) IV Push every 10 minutes PRN Severe Pain (7 - 10)  melatonin 3 milliGRAM(s) Oral at bedtime PRN Insomnia  morphine  - Injectable 2 milliGRAM(s) IV Push every 4 hours PRN Severe Pain (7 - 10)  ondansetron Injectable 4 milliGRAM(s) IV Push once PRN Nausea and/or Vomiting  ondansetron Injectable 4 milliGRAM(s) IV Push every 8 hours PRN Nausea and/or Vomiting      PHYSICAL EXAM:   Vital Signs:  Vital Signs Last 24 Hrs  T(C): 36.8 (14 May 2023 05:25), Max: 36.8 (13 May 2023 14:31)  T(F): 98.2 (14 May 2023 05:25), Max: 98.2 (13 May 2023 14:31)  HR: 75 (14 May 2023 05:25) (72 - 82)  BP: 145/95 (14 May 2023 05:25) (128/87 - 145/95)  BP(mean): --  RR: 20 (14 May 2023 05:25) (18 - 20)  SpO2: 96% (14 May 2023 05:25) (96% - 98%)    Parameters below as of 14 May 2023 05:25  Patient On (Oxygen Delivery Method): room air      Daily     Daily I&O's Summary    13 May 2023 07:01  -  14 May 2023 07:00  --------------------------------------------------------  IN: 300 mL / OUT: 0 mL / NET: 300 mL    14 May 2023 07:01  -  14 May 2023 10:26  --------------------------------------------------------  IN: 0 mL / OUT: 600 mL / NET: -600 mL        GENERAL:   NAD  HEENT:  NC/AT,  anicteric sclera  CHEST:  Clear B/L  HEART:  RRR  ABDOMEN:  Soft, mild tenderness to deep palpation in the epigastrium without rebound or guarding, non-distended, +BS,  no masses palpable  EXTR:  no edema  SKIN:  No rash or jaundice  NEURO:  Awake, alert    LABS:                        13.5   12.46 )-----------( 155      ( 14 May 2023 06:00 )             39.8       WBC Count: 12.46 K/uL (05.14.23 @ 06:00)   WBC Count: 18.34 K/uL (05.13.23 @ 07:45)   WBC Count: 19.69 K/uL (05.12.23 @ 06:14)         05-14    139  |  105  |  11  ----------------------------<  117<H>  3.6   |  24  |  0.79    Ca    8.2<L>      14 May 2023 06:00  Phos  2.5     05-13  Mg     2.0     05-13    TPro  6.4  /  Alb  2.7<L>  /  TBili  1.4<H>  /  DBili  x   /  AST  57<H>  /  ALT  179<H>  /  AlkPhos  111  05-14        Bilirubin Total, Serum: 1.4: Results verified. mg/dL (05.14.23 @ 06:00)   Bilirubin Total, Serum: 4.0 mg/dL (05.13.23 @ 07:45)   Bilirubin Total, Serum: 4.9 mg/dL (05.12.23 @ 06:14)

## 2023-05-14 NOTE — PROGRESS NOTE ADULT - PROBLEM SELECTOR PLAN 1
Liver panel markedly improved today.  Monitor liver panel, worsening for abd pain, N/V. Suspect reported discomfort is not related to procedure.

## 2023-05-15 ENCOUNTER — RESULT REVIEW (OUTPATIENT)
Age: 63
End: 2023-05-15

## 2023-05-15 ENCOUNTER — TRANSCRIPTION ENCOUNTER (OUTPATIENT)
Age: 63
End: 2023-05-15

## 2023-05-15 DIAGNOSIS — K80.50 CALCULUS OF BILE DUCT WITHOUT CHOLANGITIS OR CHOLECYSTITIS WITHOUT OBSTRUCTION: ICD-10-CM

## 2023-05-15 LAB
-  AMIKACIN: SIGNIFICANT CHANGE UP
-  AMPICILLIN/SULBACTAM: SIGNIFICANT CHANGE UP
-  AMPICILLIN: SIGNIFICANT CHANGE UP
-  AZTREONAM: SIGNIFICANT CHANGE UP
-  CEFAZOLIN: SIGNIFICANT CHANGE UP
-  CEFEPIME: SIGNIFICANT CHANGE UP
-  CEFOXITIN: SIGNIFICANT CHANGE UP
-  CEFTRIAXONE: SIGNIFICANT CHANGE UP
-  CIPROFLOXACIN: SIGNIFICANT CHANGE UP
-  ERTAPENEM: SIGNIFICANT CHANGE UP
-  GENTAMICIN: SIGNIFICANT CHANGE UP
-  IMIPENEM: SIGNIFICANT CHANGE UP
-  LEVOFLOXACIN: SIGNIFICANT CHANGE UP
-  MEROPENEM: SIGNIFICANT CHANGE UP
-  PIPERACILLIN/TAZOBACTAM: SIGNIFICANT CHANGE UP
-  TOBRAMYCIN: SIGNIFICANT CHANGE UP
-  TRIMETHOPRIM/SULFAMETHOXAZOLE: SIGNIFICANT CHANGE UP
A1C WITH ESTIMATED AVERAGE GLUCOSE RESULT: 5.9 % — HIGH (ref 4–5.6)
ALBUMIN SERPL ELPH-MCNC: 2.8 G/DL — LOW (ref 3.3–5)
ALP SERPL-CCNC: 122 U/L — HIGH (ref 40–120)
ALT FLD-CCNC: 155 U/L — HIGH (ref 10–45)
ANION GAP SERPL CALC-SCNC: 9 MMOL/L — SIGNIFICANT CHANGE UP (ref 5–17)
AST SERPL-CCNC: 47 U/L — HIGH (ref 10–40)
BILIRUB SERPL-MCNC: 1.1 MG/DL — SIGNIFICANT CHANGE UP (ref 0.2–1.2)
BLD GP AB SCN SERPL QL: SIGNIFICANT CHANGE UP
BUN SERPL-MCNC: 10 MG/DL — SIGNIFICANT CHANGE UP (ref 7–23)
CALCIUM SERPL-MCNC: 8.5 MG/DL — SIGNIFICANT CHANGE UP (ref 8.4–10.5)
CHLORIDE SERPL-SCNC: 102 MMOL/L — SIGNIFICANT CHANGE UP (ref 96–108)
CO2 SERPL-SCNC: 26 MMOL/L — SIGNIFICANT CHANGE UP (ref 22–31)
CREAT SERPL-MCNC: 0.69 MG/DL — SIGNIFICANT CHANGE UP (ref 0.5–1.3)
CULTURE RESULTS: SIGNIFICANT CHANGE UP
EGFR: 104 ML/MIN/1.73M2 — SIGNIFICANT CHANGE UP
ESTIMATED AVERAGE GLUCOSE: 123 MG/DL — HIGH (ref 68–114)
GLUCOSE SERPL-MCNC: 111 MG/DL — HIGH (ref 70–99)
HCT VFR BLD CALC: 42.8 % — SIGNIFICANT CHANGE UP (ref 39–50)
HGB BLD-MCNC: 14.7 G/DL — SIGNIFICANT CHANGE UP (ref 13–17)
LIDOCAIN IGE QN: 680 U/L — HIGH (ref 73–393)
MCHC RBC-ENTMCNC: 30.6 PG — SIGNIFICANT CHANGE UP (ref 27–34)
MCHC RBC-ENTMCNC: 34.3 GM/DL — SIGNIFICANT CHANGE UP (ref 32–36)
MCV RBC AUTO: 89 FL — SIGNIFICANT CHANGE UP (ref 80–100)
METHOD TYPE: SIGNIFICANT CHANGE UP
NRBC # BLD: 0 /100 WBCS — SIGNIFICANT CHANGE UP (ref 0–0)
ORGANISM # SPEC MICROSCOPIC CNT: SIGNIFICANT CHANGE UP
PLATELET # BLD AUTO: 160 K/UL — SIGNIFICANT CHANGE UP (ref 150–400)
POTASSIUM SERPL-MCNC: 3.4 MMOL/L — LOW (ref 3.5–5.3)
POTASSIUM SERPL-SCNC: 3.4 MMOL/L — LOW (ref 3.5–5.3)
PROT SERPL-MCNC: 6.9 G/DL — SIGNIFICANT CHANGE UP (ref 6–8.3)
RBC # BLD: 4.81 M/UL — SIGNIFICANT CHANGE UP (ref 4.2–5.8)
RBC # FLD: 13.4 % — SIGNIFICANT CHANGE UP (ref 10.3–14.5)
SODIUM SERPL-SCNC: 137 MMOL/L — SIGNIFICANT CHANGE UP (ref 135–145)
SPECIMEN SOURCE: SIGNIFICANT CHANGE UP
WBC # BLD: 7.29 K/UL — SIGNIFICANT CHANGE UP (ref 3.8–10.5)
WBC # FLD AUTO: 7.29 K/UL — SIGNIFICANT CHANGE UP (ref 3.8–10.5)

## 2023-05-15 PROCEDURE — 47562 LAPAROSCOPIC CHOLECYSTECTOMY: CPT

## 2023-05-15 PROCEDURE — 99232 SBSQ HOSP IP/OBS MODERATE 35: CPT

## 2023-05-15 PROCEDURE — 88304 TISSUE EXAM BY PATHOLOGIST: CPT | Mod: 26

## 2023-05-15 RX ORDER — AMLODIPINE BESYLATE 2.5 MG/1
5 TABLET ORAL DAILY
Refills: 0 | Status: DISCONTINUED | OUTPATIENT
Start: 2023-05-16 | End: 2023-05-16

## 2023-05-15 RX ORDER — ONDANSETRON 8 MG/1
4 TABLET, FILM COATED ORAL ONCE
Refills: 0 | Status: DISCONTINUED | OUTPATIENT
Start: 2023-05-15 | End: 2023-05-15

## 2023-05-15 RX ORDER — ONDANSETRON 8 MG/1
4 TABLET, FILM COATED ORAL EVERY 6 HOURS
Refills: 0 | Status: DISCONTINUED | OUTPATIENT
Start: 2023-05-15 | End: 2023-05-15

## 2023-05-15 RX ORDER — PIPERACILLIN AND TAZOBACTAM 4; .5 G/20ML; G/20ML
3.38 INJECTION, POWDER, LYOPHILIZED, FOR SOLUTION INTRAVENOUS ONCE
Refills: 0 | Status: DISCONTINUED | OUTPATIENT
Start: 2023-05-15 | End: 2023-05-15

## 2023-05-15 RX ORDER — POTASSIUM CHLORIDE 20 MEQ
10 PACKET (EA) ORAL ONCE
Refills: 0 | Status: COMPLETED | OUTPATIENT
Start: 2023-05-15 | End: 2023-05-15

## 2023-05-15 RX ORDER — LANOLIN ALCOHOL/MO/W.PET/CERES
3 CREAM (GRAM) TOPICAL AT BEDTIME
Refills: 0 | Status: DISCONTINUED | OUTPATIENT
Start: 2023-05-15 | End: 2023-05-16

## 2023-05-15 RX ORDER — HYDROMORPHONE HYDROCHLORIDE 2 MG/ML
1 INJECTION INTRAMUSCULAR; INTRAVENOUS; SUBCUTANEOUS
Refills: 0 | Status: DISCONTINUED | OUTPATIENT
Start: 2023-05-15 | End: 2023-05-15

## 2023-05-15 RX ORDER — PIPERACILLIN AND TAZOBACTAM 4; .5 G/20ML; G/20ML
3.38 INJECTION, POWDER, LYOPHILIZED, FOR SOLUTION INTRAVENOUS EVERY 8 HOURS
Refills: 0 | Status: DISCONTINUED | OUTPATIENT
Start: 2023-05-15 | End: 2023-05-16

## 2023-05-15 RX ORDER — OXYCODONE HYDROCHLORIDE 5 MG/1
10 TABLET ORAL EVERY 6 HOURS
Refills: 0 | Status: DISCONTINUED | OUTPATIENT
Start: 2023-05-15 | End: 2023-05-16

## 2023-05-15 RX ORDER — ENOXAPARIN SODIUM 100 MG/ML
40 INJECTION SUBCUTANEOUS EVERY 24 HOURS
Refills: 0 | Status: DISCONTINUED | OUTPATIENT
Start: 2023-05-16 | End: 2023-05-16

## 2023-05-15 RX ORDER — ONDANSETRON 8 MG/1
4 TABLET, FILM COATED ORAL EVERY 6 HOURS
Refills: 0 | Status: DISCONTINUED | OUTPATIENT
Start: 2023-05-15 | End: 2023-05-16

## 2023-05-15 RX ORDER — HYDROMORPHONE HYDROCHLORIDE 2 MG/ML
0.5 INJECTION INTRAMUSCULAR; INTRAVENOUS; SUBCUTANEOUS
Refills: 0 | Status: DISCONTINUED | OUTPATIENT
Start: 2023-05-15 | End: 2023-05-15

## 2023-05-15 RX ORDER — SODIUM CHLORIDE 9 MG/ML
1000 INJECTION, SOLUTION INTRAVENOUS
Refills: 0 | Status: DISCONTINUED | OUTPATIENT
Start: 2023-05-15 | End: 2023-05-15

## 2023-05-15 RX ORDER — ASPIRIN/CALCIUM CARB/MAGNESIUM 324 MG
81 TABLET ORAL DAILY
Refills: 0 | Status: DISCONTINUED | OUTPATIENT
Start: 2023-05-16 | End: 2023-05-16

## 2023-05-15 RX ORDER — ACETAMINOPHEN 500 MG
650 TABLET ORAL EVERY 6 HOURS
Refills: 0 | Status: DISCONTINUED | OUTPATIENT
Start: 2023-05-15 | End: 2023-05-16

## 2023-05-15 RX ORDER — OXYCODONE HYDROCHLORIDE 5 MG/1
5 TABLET ORAL EVERY 6 HOURS
Refills: 0 | Status: DISCONTINUED | OUTPATIENT
Start: 2023-05-15 | End: 2023-05-16

## 2023-05-15 RX ADMIN — OXYCODONE HYDROCHLORIDE 10 MILLIGRAM(S): 5 TABLET ORAL at 22:24

## 2023-05-15 RX ADMIN — HYDROMORPHONE HYDROCHLORIDE 0.5 MILLIGRAM(S): 2 INJECTION INTRAMUSCULAR; INTRAVENOUS; SUBCUTANEOUS at 17:00

## 2023-05-15 RX ADMIN — HYDROMORPHONE HYDROCHLORIDE 0.5 MILLIGRAM(S): 2 INJECTION INTRAMUSCULAR; INTRAVENOUS; SUBCUTANEOUS at 06:25

## 2023-05-15 RX ADMIN — AMLODIPINE BESYLATE 5 MILLIGRAM(S): 2.5 TABLET ORAL at 06:01

## 2023-05-15 RX ADMIN — OXYCODONE HYDROCHLORIDE 10 MILLIGRAM(S): 5 TABLET ORAL at 22:54

## 2023-05-15 RX ADMIN — HYDROMORPHONE HYDROCHLORIDE 0.5 MILLIGRAM(S): 2 INJECTION INTRAMUSCULAR; INTRAVENOUS; SUBCUTANEOUS at 17:15

## 2023-05-15 RX ADMIN — HYDROMORPHONE HYDROCHLORIDE 0.5 MILLIGRAM(S): 2 INJECTION INTRAMUSCULAR; INTRAVENOUS; SUBCUTANEOUS at 05:51

## 2023-05-15 RX ADMIN — SODIUM CHLORIDE 100 MILLILITER(S): 9 INJECTION, SOLUTION INTRAVENOUS at 02:24

## 2023-05-15 RX ADMIN — PIPERACILLIN AND TAZOBACTAM 25 GRAM(S): 4; .5 INJECTION, POWDER, LYOPHILIZED, FOR SOLUTION INTRAVENOUS at 09:43

## 2023-05-15 RX ADMIN — HYDROMORPHONE HYDROCHLORIDE 0.5 MILLIGRAM(S): 2 INJECTION INTRAMUSCULAR; INTRAVENOUS; SUBCUTANEOUS at 16:30

## 2023-05-15 RX ADMIN — MORPHINE SULFATE 2 MILLIGRAM(S): 50 CAPSULE, EXTENDED RELEASE ORAL at 02:54

## 2023-05-15 RX ADMIN — HYDROMORPHONE HYDROCHLORIDE 0.5 MILLIGRAM(S): 2 INJECTION INTRAMUSCULAR; INTRAVENOUS; SUBCUTANEOUS at 16:45

## 2023-05-15 RX ADMIN — PIPERACILLIN AND TAZOBACTAM 25 GRAM(S): 4; .5 INJECTION, POWDER, LYOPHILIZED, FOR SOLUTION INTRAVENOUS at 21:05

## 2023-05-15 RX ADMIN — MORPHINE SULFATE 2 MILLIGRAM(S): 50 CAPSULE, EXTENDED RELEASE ORAL at 02:24

## 2023-05-15 RX ADMIN — PIPERACILLIN AND TAZOBACTAM 25 GRAM(S): 4; .5 INJECTION, POWDER, LYOPHILIZED, FOR SOLUTION INTRAVENOUS at 02:07

## 2023-05-15 RX ADMIN — Medication 100 MILLIEQUIVALENT(S): at 09:43

## 2023-05-15 NOTE — PROGRESS NOTE ADULT - SUBJECTIVE AND OBJECTIVE BOX
Patient is a 62y old  Male who presents with a chief complaint of Abdominal pain (15 May 2023 07:27)    Patient seen and examined at bedside. No overnight events reported. Patient is resting in bed, he c/o some RUQ pain but tolerable at this time. Aware of plan for OR today     ALLERGIES:  No Known Allergies    MEDICATIONS  (STANDING):  amLODIPine   Tablet 5 milliGRAM(s) Oral daily  indomethacin Suppository 50 milliGRAM(s) Rectal once  lactated ringers. 1000 milliLiter(s) (100 mL/Hr) IV Continuous <Continuous>  piperacillin/tazobactam IVPB.. 3.375 Gram(s) IV Intermittent every 8 hours  potassium chloride  10 mEq/100 mL IVPB 10 milliEquivalent(s) IV Intermittent once    MEDICATIONS  (PRN):  acetaminophen     Tablet .. 650 milliGRAM(s) Oral every 6 hours PRN Temp greater or equal to 38C (100.4F), Mild Pain (1 - 3)  aluminum hydroxide/magnesium hydroxide/simethicone Suspension 30 milliLiter(s) Oral every 4 hours PRN Dyspepsia  fentaNYL    Injectable 25 MICROGram(s) IV Push every 5 minutes PRN Moderate Pain (4 - 6)  HYDROmorphone  Injectable 0.5 milliGRAM(s) IV Push every 10 minutes PRN Severe Pain (7 - 10)  melatonin 3 milliGRAM(s) Oral at bedtime PRN Insomnia  morphine  - Injectable 2 milliGRAM(s) IV Push every 4 hours PRN Severe Pain (7 - 10)  ondansetron Injectable 4 milliGRAM(s) IV Push once PRN Nausea and/or Vomiting  ondansetron Injectable 4 milliGRAM(s) IV Push every 8 hours PRN Nausea and/or Vomiting    Vital Signs Last 24 Hrs  T(F): 98.4 (15 May 2023 05:38), Max: 98.7 (14 May 2023 12:00)  HR: 69 (15 May 2023 05:38) (63 - 73)  BP: 141/104 (15 May 2023 05:38) (138/83 - 141/104)  RR: 20 (15 May 2023 05:38) (17 - 20)  SpO2: 95% (15 May 2023 05:38) (95% - 97%)    I&O's Summary  14 May 2023 07:01  -  15 May 2023 07:00  --------------------------------------------------------  IN: 2625 mL / OUT: 4100 mL / NET: -1475 mL    PHYSICAL EXAM:  General: NAD, A/O x 3  ENT: No gross hearing impairment, Moist mucous membranes, no thrush  Neck: Supple, No JVD  Lungs: Clear to auscultation bilaterally, good air entry, non-labored breathing  Cardio: RRR, S1/S2, No murmur  Abdomen: Soft, mildly distended, mild RUQ tenderness; Bowel sounds present  Extremities: No calf tenderness, No cyanosis, No pitting edema  Psych: Appropriate mood and affect    LABS:                        14.7   7.29  )-----------( 160      ( 15 May 2023 07:40 )             42.8     05-15    137  |  102  |  10  ----------------------------<  111  3.4   |  26  |  0.69    Ca    8.5      15 May 2023 07:40  Phos  2.5     05-13  Mg     2.0     05-13    TPro  6.9  /  Alb  2.8  /  TBili  1.1  /  DBili  x   /  AST  47  /  ALT  155  /  AlkPhos  122  05-15    Lipase, Serum: 680 U/L (05-15-23 @ 07:40)  Lipase, Serum: 2685 U/L (05-14-23 @ 06:00)  Lipase, Serum: 154 U/L (05-11-23 @ 15:20)    Culture - Blood (collected 11 May 2023 17:00)  Source: .Blood Blood  Gram Stain (13 May 2023 16:40):    Growth in aerobic bottle: Gram Negative Rods  Final Report (15 May 2023 08:55):    Growth in aerobic bottle: Escherichia coli    ***Blood Panel PCR results on this specimen are available    approximately 3 hours after the Gram stain result.***    Gram stain, PCR, and/or culture results may not always    correspond due to difference in methodologies.    ************************************************************    This PCR assay was performed by multiplex PCR. This    Assay tests for 66 bacterial and resistance gene targets.    Please refer to the [facility] test directory    at [email or web address] for details.  Organism: Blood Culture PCR  Escherichia coli (15 May 2023 08:55)  Organism: Escherichia coli (15 May 2023 08:55)      Method Type: KAY      -  Amikacin: S <=16      -  Ampicillin: R >16 These ampicillin results predict results for amoxicillin      -  Ampicillin/Sulbactam: R >16/8 Enterobacter, Klebsiella aerogenes, Citrobacter, and Serratia may develop resistance during prolonged therapy (3-4 days)      -  Aztreonam: S <=4      -  Cefazolin: I 4 Enterobacter, Klebsiella aerogenes, Citrobacter, and Serratia may develop resistance during prolonged therapy (3-4 days)      -  Cefepime: S <=2      -  Cefoxitin: S <=8      -  Ceftriaxone: S <=1 Enterobacter, Klebsiella aerogenes, Citrobacter, and Serratia may develop resistance during prolonged therapy      -  Ciprofloxacin: S <=0.25      -  Ertapenem: S <=0.5      -  Gentamicin: S <=2      -  Imipenem: S <=1      -  Levofloxacin: S <=0.5      -  Meropenem: S <=1      -  Piperacillin/Tazobactam: S <=8      -  Tobramycin: S <=2      -  Trimethoprim/Sulfamethoxazole: S <=0.5/9.5  Organism: Blood Culture PCR (15 May 2023 08:55)      Method Type: PCR      -  Escherichia coli: Detec    Culture - Blood (collected 11 May 2023 17:00)  Source: .Blood Blood  Preliminary Report (12 May 2023 22:03):    No growth to date.    Culture - Urine (collected 11 May 2023 16:10)  Source: Clean Catch Clean Catch (Midstream)  Final Report (12 May 2023 16:46):    <10,000 CFU/mL Normal Urogenital Lela    RADIOLOGY & ADDITIONAL TESTS:    Care Discussed with Consultants/Other Providers:

## 2023-05-15 NOTE — PROGRESS NOTE ADULT - ASSESSMENT
61yo male PMH of HTN presenting to Er with cc of abdominal pain.    Acute cholangitis with severe sepsis  Transaminitis  Elevated bilirubin  Obstructing Gallstone in CBD  - s/p ERCP 5/12, stent placed  - LFTs, WBCs improving, continue to monitor  - Plan for OR today (5/15/23) with Dr. Green for lap ian   - Continue zosyn for now  - Diet as per surgery   - Pain control  - Surgery, GI following    HTN  - Continue norvasc 5mg daily  - On ASA 81mg daily, holding ASA for OR today, resume once cleared by surgery    DVT PPX  - Lovenox, on hold for now prior to OR    DISP: Pending course    [phone] Sangita daughter    63yo male PMH of HTN presenting to Er with cc of abdominal pain.    Acute cholangitis with severe sepsis  Transaminitis  Elevated bilirubin  Obstructing Gallstone in CBD  - s/p ERCP 5/12, stent placed  - LFTs, WBCs improving, continue to monitor  - Plan for OR today (5/15/23) with Dr. Green for lap ian   - Continue zosyn for now  - Diet as per surgery   - Pain control  - Surgery, GI following    HTN  - Continue norvasc 5mg daily  - On ASA 81mg daily, holding ASA for OR today, resume once cleared by surgery    DVT PPX  - Lovenox, on hold for now prior to OR    DISP: Plan for OR today    [phone] Sangita erlinda    61yo male PMH of HTN presenting to Er with cc of abdominal pain.    Acute cholangitis with severe sepsis  Transaminitis  Elevated bilirubin  Obstructing Gallstone in CBD  Bacteremia - Gram negative rods  - s/p ERCP 5/12, stent placed  - LFTs, WBCs improving, continue to monitor  - Plan for OR today (5/15/23) with Dr. Green for lap ian   - Continue zosyn for now  - Diet as per surgery   - Pain control  - Surgery, GI following  - Noted gram - rods in blood culture from 5/11, ID consult pending     HTN  - Continue norvasc 5mg daily  - On ASA 81mg daily, holding ASA for OR today, resume once cleared by surgery    DVT PPX  - Lovenox, on hold for now prior to OR    DISP: Plan for OR today    [phone] Sangita coffey    61yo male PMH of HTN presenting to Er with cc of abdominal pain.    Acute cholangitis with severe sepsis  Transaminitis  Elevated bilirubin  Obstructing Gallstone in CBD  E Coli bacteremia  - s/p ERCP 5/12, stent placed  - LFTs, WBCs improving, continue to monitor  - Plan for OR today (5/15/23) with Dr. Green for lap ian   - Continue zosyn    - Diet as per surgery   - Pain control  - Surgery, GI following  - Noted E Coli sensitive to zosyn in blood cultures from 5/11, ID consult pending     HTN  - Continue norvasc 5mg daily  - On ASA 81mg daily, holding ASA for OR today, resume once cleared by surgery    DVT PPX  - Lovenox, on hold for now prior to OR    DISP: Plan for OR today    [phone] Sangita erlinda

## 2023-05-15 NOTE — BRIEF OPERATIVE NOTE - NSICDXBRIEFPROCEDURE_GEN_ALL_CORE_FT
PROCEDURES:  Laparoscopic cholecystectomy 15-May-2023 16:09:19  Kaity Lara  Repair of reducible anterior abdominal wall hernia without history of prior repair, with insertion of mesh, defect less than 3 cm in length 15-May-2023 16:12:00 Umbilical hernia repair, no mesh used Kaity Lara

## 2023-05-15 NOTE — CONSULT NOTE ADULT - CONSULT REASON
aníbal bacteremia
Gallstone, abdominal pain, CBD dilatation
fever, chills, abnl ct, elevated liver test

## 2023-05-15 NOTE — CONSULT NOTE ADULT - SUBJECTIVE AND OBJECTIVE BOX
Chief Complaint:  Patient is a 62y old  Male who presents with a chief complaint of Abdominal pain  who has had dark urine intermittently for many months with fatigue but no n/v.  Now admitted with fever and upper abdominal pain.    Allergies:  No Known Allergies      Medications:  acetaminophen     Tablet .. 650 milliGRAM(s) Oral every 6 hours PRN  aluminum hydroxide/magnesium hydroxide/simethicone Suspension 30 milliLiter(s) Oral every 4 hours PRN  aspirin enteric coated 81 milliGRAM(s) Oral daily  enoxaparin Injectable 40 milliGRAM(s) SubCutaneous every 24 hours  melatonin 3 milliGRAM(s) Oral at bedtime PRN  ondansetron Injectable 4 milliGRAM(s) IV Push every 8 hours PRN  piperacillin/tazobactam IVPB.. 3.375 Gram(s) IV Intermittent every 8 hours      PMHX/PSHX:  No pertinent past medical history    No significant past surgical history        Family history:  No pertinent family history in first degree relatives        Social History:     ROS:     General:  No wt loss, fevers, chills, night sweats, fatigue,   Eyes:  Good vision, no reported pain  ENT:  No sore throat, pain, runny nose, dysphagia  CV:  No pain, palpitations, hypo/hypertension  Resp:  No dyspnea, cough, tachypnea, wheezing  GI:  No pain, No nausea, No vomiting, No diarrhea, No constipation, No weight loss, No fever, No pruritis, No rectal bleeding, No tarry stools, No dysphagia,  :  No pain, bleeding, incontinence, nocturia  Muscle:  No pain, weakness  Neuro:  No weakness, tingling, memory problems  Psych:  No fatigue, insomnia, mood problems, depression  Endocrine:  No polyuria, polydipsia, cold/heat intolerance  Heme:  No petechiae, ecchymosis, easy bruisability  Skin:  No rash, tattoos, scars, edema      PHYSICAL EXAM:   Vital Signs:  Vital Signs Last 24 Hrs  T(C): 40.4 (11 May 2023 17:04), Max: 40.4 (11 May 2023 17:04)  T(F): 104.7 (11 May 2023 17:04), Max: 104.7 (11 May 2023 17:04)  HR: 109 (11 May 2023 17:04) (75 - 109)  BP: 112/68 (11 May 2023 17:04) (112/68 - 159/112)  BP(mean): --  RR: 18 (11 May 2023 17:04) (18 - 19)  SpO2: 96% (11 May 2023 17:04) (96% - 96%)    Parameters below as of 11 May 2023 17:04  Patient On (Oxygen Delivery Method): room air      Daily Height in cm: 157.48 (11 May 2023 14:35)    Daily     GENERAL:  Appears stated age, well-groomed, well-nourished, no distress  HEENT:  NC/AT,  conjunctivae clear and pink, no thyromegaly, nodules, adenopathy, no JVD, sclera -anicteric  CHEST:  Full & symmetric excursion, no increased effort, breath sounds clear  HEART:  Regular rhythm, S1, S2, no murmur/rub/S3/S4, no abdominal bruit, no edema  ABDOMEN:  Soft, non-tender, non-distended, normoactive bowel sounds,  no masses , no hepatosplenomegaly, no signs of chronic liver disease  EXTREMITIES:  no cyanosis,clubbing or edema  SKIN:  No rash/erythema/ecchymoses/petechiae/wounds/abscess/warm/dry  NEURO:  Alert, oriented, no asterixis, no tremor, no encephalopathy    LABS:                        15.4   10.72 )-----------( 177      ( 11 May 2023 15:20 )             44.7     05-11    136  |  100  |  17  ----------------------------<  137<H>  3.7   |  28  |  0.97    Ca    9.0      11 May 2023 15:20    TPro  7.4  /  Alb  3.8  /  TBili  2.5<H>  /  DBili  x   /  AST  138<H>  /  ALT  139<H>  /  AlkPhos  157<H>  11    LIVER FUNCTIONS - ( 11 May 2023 15:20 )  Alb: 3.8 g/dL / Pro: 7.4 g/dL / ALK PHOS: 157 U/L / ALT: 139 U/L / AST: 138 U/L / GGT: x             Urinalysis Basic - ( 11 May 2023 16:10 )    Color: Dark Yellow / Appearance: Clear / S.015 / pH: x  Gluc: x / Ketone: Negative mg/dL  / Bili: Small / Urobili: 4.0 mg/dL   Blood: x / Protein: 30 mg/dL / Nitrite: Negative   Leuk Esterase: Negative / RBC: 0 /HPF / WBC 0 /HPF   Sq Epi: x / Non Sq Epi: x / Bacteria: Negative /HPF      Amylase Serum--      Lipase [id number]       Ammonia--      Imaging:  ACC: [id number] EXAM:  CT ABDOMEN AND PELVIS IC   ORDERED BY:  PHAN MARTINEZ     PROCEDURE DATE:  2023          INTERPRETATION:  CLINICAL INFORMATION: Sudden onset of epigastric pain   and chills    COMPARISON: None.    CONTRAST/COMPLICATIONS:  IV Contrast: Omnipaque 350  90 cc administered   10 cc discarded  Oral Contrast: NONE  Complications: None reported at time of study completion    PROCEDURE:  CT of the Abdomen and Pelvis was performed.  Sagittal and coronal reformats were performed.    FINDINGS:  LOWER CHEST: Trace bibasilar dependent atelectasis.    LIVER: Within normal limits.  BILE DUCTS: Dilated common bile duct measures up to 13 mm with a smooth   distal taper. No radiopaque choledocholithiasis. Trace central   intrahepatic bile duct dilatation  GALLBLADDER: Distended gallbladder. No radiopaque stones or wall   thickening.  SPLEEN: Within normal limits.  PANCREAS: Within normal limits.  ADRENALS: Within normal limits.  Subcentimeter bilateral hypodense renal lesions, too small to   characterize. Symmetric renal enhancement without hydronephrosis.    BLADDER: Within normal limits.  REPRODUCTIVE ORGANS: Prostate within normal limits.    BOWEL: A 2 cm periampullary duodenal diverticulum. No bowel obstruction.   Appendix is normal.  PERITONEUM: No ascites.  VESSELS: Within normal limits.  RETROPERITONEUM/LYMPH NODES: No lymphadenopathy.  ABDOMINAL WALL: Tiny fat-containing umbilical and left inguinal hernias.  BONES: Degenerative changes.    IMPRESSION:  Gallbladder distention with biliary ductal dilatation. No radiopaque   cholelithiasis or choledocholithiasis. Recommend correlating to the   presence of a Rosenbaum sign as well as correlation with LFTs. Further   evaluation with gallbladder ultrasound can beconsidered as clinically   warranted.      
This 62 year old man developed mid-abdominal pain yesterday morning which eventually radiated into the RUQ. He presented to the ED where an an ultrasound demonstrated a gallstone and sludge, without any evidence of cholecystitis, and a dilated CBD(11 mm.). A CT scan demonstrated a dilated CBD (13 mm) without any evidence of stones in the GB or CBD. Today, the pain is improved, but the LFT's and bilrubin (4.9 -mostly direct) are elevated along with a elevated WBC (19.6).    PAST MEDICAL & SURGICAL HISTORY:  No pertinent past medical history      No significant past surgical history        MEDICATIONS  (STANDING):  amLODIPine   Tablet 5 milliGRAM(s) Oral daily  aspirin enteric coated 81 milliGRAM(s) Oral daily  enoxaparin Injectable 40 milliGRAM(s) SubCutaneous every 24 hours  indomethacin Suppository 50 milliGRAM(s) Rectal once  piperacillin/tazobactam IVPB.. 3.375 Gram(s) IV Intermittent every 8 hours    MEDICATIONS  (PRN):  acetaminophen     Tablet .. 650 milliGRAM(s) Oral every 6 hours PRN Temp greater or equal to 38C (100.4F), Mild Pain (1 - 3)  aluminum hydroxide/magnesium hydroxide/simethicone Suspension 30 milliLiter(s) Oral every 4 hours PRN Dyspepsia  melatonin 3 milliGRAM(s) Oral at bedtime PRN Insomnia  morphine  - Injectable 2 milliGRAM(s) IV Push every 4 hours PRN Severe Pain (7 - 10)  ondansetron Injectable 4 milliGRAM(s) IV Push every 8 hours PRN Nausea and/or Vomiting      Vital Signs Last 24 Hrs  T(C): 38.1 (12 May 2023 08:55), Max: 40.4 (11 May 2023 17:04)  T(F): 100.5 (12 May 2023 08:55), Max: 104.7 (11 May 2023 17:04)  HR: 85 (12 May 2023 08:55) (75 - 109)  BP: 118/76 (12 May 2023 08:55) (112/68 - 159/112)  BP(mean): --  RR: 18 (12 May 2023 08:55) (18 - 19)  SpO2: 96% (12 May 2023 08:55) (96% - 96%)    Parameters below as of 12 May 2023 08:55  Patient On (Oxygen Delivery Method): room air      I&O's Detail      CBC Full  -  ( 12 May 2023 06:14 )  WBC Count : 19.69 K/uL  RBC Count : 4.43 M/uL  Hemoglobin : 13.7 g/dL  Hematocrit : 40.2 %  Platelet Count - Automated : 161 K/uL  Mean Cell Volume : 90.7 fl  Mean Cell Hemoglobin : 30.9 pg  Mean Cell Hemoglobin Concentration : 34.1 gm/dL  Auto Neutrophil # : x  Auto Lymphocyte # : x  Auto Monocyte # : x  Auto Eosinophil # : x  Auto Basophil # : x  Auto Neutrophil % : x  Auto Lymphocyte % : x  Auto Monocyte % : x  Auto Eosinophil % : x  Auto Basophil % : x    05-12    137  |  102  |  20  ----------------------------<  113<H>  4.1   |  28  |  1.29    Ca    8.0<L>      12 May 2023 06:14  Mg     1.6     05-12    TPro  6.3  /  Alb  2.9<L>  /  TBili  4.9<H>  /  DBili  3.5<H>  /  AST  205<H>  /  ALT  272<H>  /  AlkPhos  118  05-12    LIVER FUNCTIONS - ( 12 May 2023 06:14 )  Alb: 2.9 g/dL / Pro: 6.3 g/dL / ALK PHOS: 118 U/L / ALT: 272 U/L / AST: 205 U/L / GGT: x               PHYSICAL EXAM:      Constitutional:    Eyes:    ENMT:    Neck:    Breasts:    Back:    Respiratory:    Cardiovascular:    Gastrointestinal:    Genitourinary:    Rectal:    Extremities:    Vascular:    Neurological:    Skin:    Lymph Nodes:    Musculoskeletal:    Psychiatric:        
HPI:   Patient is a 62y male admitted 4 d ago for abdomen pain. He was found to have choledocholithiasis and acute cholecystitis. He underwent ercp, sphinterotomy and stone removal on 5/12 then today had lap ian and hernia repair. He grew ecoli from the blood. He is just post op and is very hungry. His lipase went up after ercp but it is coming down and his elevated bili is improved.     REVIEW OF SYSTEMS:  All other review of systems negative (Comprehensive ROS)    PAST MEDICAL & SURGICAL HISTORY:  No pertinent past medical history      No significant past surgical history          Allergies    No Known Allergies    Intolerances        Antimicrobials Day #  :5/10  piperacillin/tazobactam IVPB.. 3.375 Gram(s) IV Intermittent every 8 hours    Other Medications:  acetaminophen     Tablet .. 650 milliGRAM(s) Oral every 6 hours PRN  aluminum hydroxide/magnesium hydroxide/simethicone Suspension 30 milliLiter(s) Oral every 4 hours PRN  amLODIPine   Tablet 5 milliGRAM(s) Oral daily  indomethacin Suppository 50 milliGRAM(s) Rectal once  melatonin 3 milliGRAM(s) Oral at bedtime PRN  ondansetron Injectable 4 milliGRAM(s) IV Push every 6 hours PRN  oxyCODONE    IR 10 milliGRAM(s) Oral every 6 hours PRN  oxyCODONE    IR 5 milliGRAM(s) Oral every 6 hours PRN      FAMILY HISTORY:  No pertinent family history in first degree relatives        SOCIAL HISTORY:  Smoking: [ ]Yes [ x]No  ETOH: [ ]Yes x[ ]No  Drug Use: [ ]Yes [ ]xNo   [x ] Single[ ]    T(F): 97.7 (05-15-23 @ 17:51), Max: 98.4 (05-15-23 @ 05:38)  HR: 78 (05-15-23 @ 17:51)  BP: 125/102 (05-15-23 @ 17:51)  RR: 19 (05-15-23 @ 17:51)  SpO2: 91% (05-15-23 @ 17:51)  Wt(kg): --    PHYSICAL EXAM:  General: alert, no acute distress  Eyes:  anicteric, no conjunctival injection, no discharge  Oropharynx: no lesions or injection 	  Neck: supple, without adenopathy  Lungs: clear to auscultation  Heart: regular rate and rhythm; no murmur, rubs or gallops  Abdomen: distended, wounds clean without mass or organomegaly  Skin: no lesions  Extremities: no clubbing, cyanosis, or edema  Neurologic: alert, oriented, moves all extremities    LAB RESULTS:                        14.7   7.29  )-----------( 160      ( 15 May 2023 07:40 )             42.8     05-15    137  |  102  |  10  ----------------------------<  111<H>  3.4<L>   |  26  |  0.69    Ca    8.5      15 May 2023 07:40    TPro  6.9  /  Alb  2.8<L>  /  TBili  1.1  /  DBili  x   /  AST  47<H>  /  ALT  155<H>  /  AlkPhos  122<H>  05-15    LIVER FUNCTIONS - ( 15 May 2023 07:40 )  Alb: 2.8 g/dL / Pro: 6.9 g/dL / ALK PHOS: 122 U/L / ALT: 155 U/L / AST: 47 U/L / GGT: x               MICROBIOLOGY:  RECENT CULTURES:  05-11 @ 17:00 .Blood Blood Blood Culture PCR  Escherichia coli    No growth to date.    Growth in aerobic bottle: Gram Negative Rods    05-11 @ 16:10 Clean Catch Clean Catch (Midstream)     <10,000 CFU/mL Normal Urogenital Lela            RADIOLOGY REVIEWED:  < from: CT Abdomen and Pelvis w/ IV Cont (05.11.23 @ 16:46) >    ACC: [id number] EXAM:  CT ABDOMEN AND PELVIS IC   ORDERED BY:  PHAN MARTINEZ     PROCEDURE DATE:  05/11/2023          INTERPRETATION:  CLINICAL INFORMATION: Sudden onset of epigastric pain   and chills    COMPARISON: None.    CONTRAST/COMPLICATIONS:  IV Contrast: Omnipaque 350  90 cc administered   10 cc discarded  Oral Contrast: NONE  Complications: None reported at time of study completion    PROCEDURE:  CT of the Abdomen and Pelvis was performed.  Sagittal and coronal reformats were performed.    FINDINGS:  LOWER CHEST: Trace bibasilar dependent atelectasis.    LIVER: Within normal limits.  BILE DUCTS: Dilated common bile duct measures up to 13 mm with a smooth   distal taper. No radiopaque choledocholithiasis. Trace central   intrahepatic bile duct dilatation  GALLBLADDER: Distended gallbladder. No radiopaque stones or wall   thickening.  SPLEEN: Within normal limits.  PANCREAS: Within normal limits.  ADRENALS: Within normal limits.  Subcentimeter bilateral hypodense renal lesions, too small to   characterize. Symmetric renal enhancement without hydronephrosis.    BLADDER: Within normal limits.  REPRODUCTIVE ORGANS: Prostate within normal limits.    BOWEL: A 2 cm periampullary duodenal diverticulum. No bowel obstruction.   Appendix is normal.  PERITONEUM: No ascites.  VESSELS: Within normal limits.  RETROPERITONEUM/LYMPH NODES: No lymphadenopathy.  ABDOMINAL WALL: Tiny fat-containing umbilical and left inguinal hernias.  BONES: Degenerative changes.    IMPRESSION:  Gallbladder distention with biliary ductal dilatation. No radiopaque   cholelithiasis or choledocholithiasis. Recommend correlating to the   presence of a Rosenbaum sign as well as correlation with LFTs. Further   evaluation with gallbladder ultrasound can beconsidered as clinically   warranted.        --- End of Report ---      < from: US Abdomen Upper Quadrant Right (05.11.23 @ 16:01) >    ACC: [id number] EXAM:  US ABDOMEN RT UPR QUADRANT   ORDERED BY:  PHAN MARTINEZ     PROCEDURE DATE:  05/11/2023          INTERPRETATION:  CLINICAL INFORMATION: Right upper quadrant abdominal   pain, nausea and vomiting.    COMPARISON: None available.    TECHNIQUE: Sonography of the right upper quadrant.    FINDINGS:  Liver: The liver is normal in size. Increased echotexture of the hepatic   parenchyma suggests hepatic steatosis. No focal hepatic masses.  Bile ducts: No evidence for intrahepatic biliary ductal dilatation.   Common bile duct measures 11 mm, increased in caliber.  Gallbladder: A gallstone as well as echogenic sludge noted within the   gallbladder lumen. No gallbladder wall thickening.  Pancreas: Visualized portions are within normal limits.  Right kidney: 11.6 cm. No hydronephrosis.  Ascites: Trace perihepatic ascites.  IVC and aorta: Visualized portions are within normal limits.    IMPRESSION:  Increased caliber of the extrahepatic CBD identified measuring 11 mm.   Gallstone and echogenic sludge. No gallbladder wall thickening.          < end of copied text >        RAMIRO CAMILO MD; Attending Radiologist  This document has been electronically signed. May 11 2023  4:51PM    < end of copied text >          Impression:  Patient admitted for abdomen pain, found to have elevated bili and liver enzymes, underwent ercp, sphinterotomy and stone removal . He grew ecoli from the blood and is on zosyn. He had rise of lipase post ercp but it is coming down and today he underwent lap ian and abdomen hernia repair.   Recommendations:  continue zosyn day 5/10  when he is ready to go home, can change to po cefdinir 300mg po bid to finish the 10 days course of iv abx for bacteremia

## 2023-05-15 NOTE — CHART NOTE - NSCHARTNOTEFT_GEN_A_CORE
Pt is scheduled for lap Cholecystectomy today.Pt is NPO.  He is s/p ERCP on 5/12. Pt has no new complaints at this time. No new changes in PE. pt is preop'd for OR today.                          14.7   7.29  )-----------( 160      ( 15 May 2023 07:40 )             42.8   05-15    137  |  102  |  10  ----------------------------<  111<H>  3.4<L>   |  26  |  0.69    Ca    8.5      15 May 2023 07:40    TPro  6.9  /  Alb  2.8<L>  /  TBili  1.1  /  DBili  x   /  AST  47<H>  /  ALT  155<H>  /  AlkPhos  122<H>  05-15    Lipase, Serum: 680 U/L (05.15.23 @ 07:40) ABO RH Interpretation: B NEG (05.15.23 @ 07:40)       Historical Values  ABO RH Interpretation: B NEG (05.15.23 @ 07:40)   ABO RH Interpretation: B NEG (05.11.23 @ 15:20)     above discussed with Dr Green

## 2023-05-16 ENCOUNTER — TRANSCRIPTION ENCOUNTER (OUTPATIENT)
Age: 63
End: 2023-05-16

## 2023-05-16 LAB
ALBUMIN SERPL ELPH-MCNC: 2.9 G/DL — LOW (ref 3.3–5)
ALP SERPL-CCNC: 118 U/L — SIGNIFICANT CHANGE UP (ref 40–120)
ALT FLD-CCNC: 194 U/L — HIGH (ref 10–45)
ANION GAP SERPL CALC-SCNC: 9 MMOL/L — SIGNIFICANT CHANGE UP (ref 5–17)
AST SERPL-CCNC: 102 U/L — HIGH (ref 10–40)
BILIRUB DIRECT SERPL-MCNC: 0.5 MG/DL — HIGH (ref 0–0.3)
BILIRUB INDIRECT FLD-MCNC: 0.5 MG/DL — SIGNIFICANT CHANGE UP (ref 0.2–1)
BILIRUB SERPL-MCNC: 1 MG/DL — SIGNIFICANT CHANGE UP (ref 0.2–1.2)
BUN SERPL-MCNC: 13 MG/DL — SIGNIFICANT CHANGE UP (ref 7–23)
CALCIUM SERPL-MCNC: 8.8 MG/DL — SIGNIFICANT CHANGE UP (ref 8.4–10.5)
CHLORIDE SERPL-SCNC: 101 MMOL/L — SIGNIFICANT CHANGE UP (ref 96–108)
CO2 SERPL-SCNC: 24 MMOL/L — SIGNIFICANT CHANGE UP (ref 22–31)
CREAT SERPL-MCNC: 0.75 MG/DL — SIGNIFICANT CHANGE UP (ref 0.5–1.3)
CULTURE RESULTS: SIGNIFICANT CHANGE UP
EGFR: 102 ML/MIN/1.73M2 — SIGNIFICANT CHANGE UP
GLUCOSE SERPL-MCNC: 158 MG/DL — HIGH (ref 70–99)
HCT VFR BLD CALC: 42.2 % — SIGNIFICANT CHANGE UP (ref 39–50)
HGB BLD-MCNC: 14.9 G/DL — SIGNIFICANT CHANGE UP (ref 13–17)
LIDOCAIN IGE QN: 310 U/L — SIGNIFICANT CHANGE UP (ref 73–393)
MCHC RBC-ENTMCNC: 31 PG — SIGNIFICANT CHANGE UP (ref 27–34)
MCHC RBC-ENTMCNC: 35.3 GM/DL — SIGNIFICANT CHANGE UP (ref 32–36)
MCV RBC AUTO: 87.9 FL — SIGNIFICANT CHANGE UP (ref 80–100)
NRBC # BLD: 0 /100 WBCS — SIGNIFICANT CHANGE UP (ref 0–0)
PLATELET # BLD AUTO: 182 K/UL — SIGNIFICANT CHANGE UP (ref 150–400)
POTASSIUM SERPL-MCNC: 4.1 MMOL/L — SIGNIFICANT CHANGE UP (ref 3.5–5.3)
POTASSIUM SERPL-SCNC: 4.1 MMOL/L — SIGNIFICANT CHANGE UP (ref 3.5–5.3)
PROT SERPL-MCNC: 7.2 G/DL — SIGNIFICANT CHANGE UP (ref 6–8.3)
RBC # BLD: 4.8 M/UL — SIGNIFICANT CHANGE UP (ref 4.2–5.8)
RBC # FLD: 13.1 % — SIGNIFICANT CHANGE UP (ref 10.3–14.5)
SODIUM SERPL-SCNC: 134 MMOL/L — LOW (ref 135–145)
SPECIMEN SOURCE: SIGNIFICANT CHANGE UP
WBC # BLD: 8.97 K/UL — SIGNIFICANT CHANGE UP (ref 3.8–10.5)
WBC # FLD AUTO: 8.97 K/UL — SIGNIFICANT CHANGE UP (ref 3.8–10.5)

## 2023-05-16 PROCEDURE — 99239 HOSP IP/OBS DSCHRG MGMT >30: CPT

## 2023-05-16 RX ORDER — OXYCODONE HYDROCHLORIDE 5 MG/1
1 TABLET ORAL
Qty: 0 | Refills: 0 | DISCHARGE
Start: 2023-05-16

## 2023-05-16 RX ORDER — ACETAMINOPHEN 500 MG
2 TABLET ORAL
Qty: 0 | Refills: 0 | DISCHARGE
Start: 2023-05-16

## 2023-05-16 RX ORDER — CEFDINIR 250 MG/5ML
1 POWDER, FOR SUSPENSION ORAL
Qty: 10 | Refills: 0
Start: 2023-05-16 | End: 2023-05-20

## 2023-05-16 RX ORDER — OXYCODONE HYDROCHLORIDE 5 MG/1
1 TABLET ORAL
Qty: 9 | Refills: 0
Start: 2023-05-16 | End: 2023-05-18

## 2023-05-16 RX ADMIN — PIPERACILLIN AND TAZOBACTAM 25 GRAM(S): 4; .5 INJECTION, POWDER, LYOPHILIZED, FOR SOLUTION INTRAVENOUS at 13:05

## 2023-05-16 RX ADMIN — OXYCODONE HYDROCHLORIDE 5 MILLIGRAM(S): 5 TABLET ORAL at 13:43

## 2023-05-16 RX ADMIN — AMLODIPINE BESYLATE 5 MILLIGRAM(S): 2.5 TABLET ORAL at 06:25

## 2023-05-16 RX ADMIN — PIPERACILLIN AND TAZOBACTAM 25 GRAM(S): 4; .5 INJECTION, POWDER, LYOPHILIZED, FOR SOLUTION INTRAVENOUS at 06:26

## 2023-05-16 RX ADMIN — ENOXAPARIN SODIUM 40 MILLIGRAM(S): 100 INJECTION SUBCUTANEOUS at 06:26

## 2023-05-16 RX ADMIN — Medication 81 MILLIGRAM(S): at 12:12

## 2023-05-16 RX ADMIN — OXYCODONE HYDROCHLORIDE 5 MILLIGRAM(S): 5 TABLET ORAL at 13:13

## 2023-05-16 NOTE — PROGRESS NOTE ADULT - NS ATTEND OPT1 GEN_ALL_CORE
I independently performed the documented:
I independently performed the documented:
I attest my time as attending is greater than 50% of the total combined time spent on qualifying patient care activities by the PA/NP and attending.

## 2023-05-16 NOTE — PROGRESS NOTE ADULT - SUBJECTIVE AND OBJECTIVE BOX
Patient is a 62y old  Male who presents with a chief complaint of Abdominal pain (16 May 2023 07:12)    Patient seen and examined at bedside. No overnight events reported.     ALLERGIES:  No Known Allergies    MEDICATIONS  (STANDING):  amLODIPine   Tablet 5 milliGRAM(s) Oral daily  aspirin enteric coated 81 milliGRAM(s) Oral daily  enoxaparin Injectable 40 milliGRAM(s) SubCutaneous every 24 hours  indomethacin Suppository 50 milliGRAM(s) Rectal once  piperacillin/tazobactam IVPB.. 3.375 Gram(s) IV Intermittent every 8 hours    MEDICATIONS  (PRN):  acetaminophen     Tablet .. 650 milliGRAM(s) Oral every 6 hours PRN Mild Pain (1 - 3)  aluminum hydroxide/magnesium hydroxide/simethicone Suspension 30 milliLiter(s) Oral every 4 hours PRN Dyspepsia  melatonin 3 milliGRAM(s) Oral at bedtime PRN Insomnia  ondansetron Injectable 4 milliGRAM(s) IV Push every 6 hours PRN Nausea and/or Vomiting  oxyCODONE    IR 5 milliGRAM(s) Oral every 6 hours PRN Moderate Pain (4 - 6)  oxyCODONE    IR 10 milliGRAM(s) Oral every 6 hours PRN Severe Pain (7 - 10)    Vital Signs Last 24 Hrs  T(F): 98.4 (16 May 2023 05:22), Max: 98.4 (15 May 2023 19:50)  HR: 68 (16 May 2023 05:22) (68 - 110)  BP: 145/102 (16 May 2023 05:22) (125/102 - 145/102)  RR: 24 (16 May 2023 05:22) (16 - 24)  SpO2: 96% (16 May 2023 05:22) (91% - 96%)    I&O's Summary  15 May 2023 07:01  -  16 May 2023 07:00  --------------------------------------------------------  IN: 235 mL / OUT: 1000 mL / NET: -765 mL    PHYSICAL EXAM:  General: NAD, A/O x 3  ENT: No gross hearing impairment, Moist mucous membranes, no thrush  Neck: Supple, No JVD  Lungs: Clear to auscultation bilaterally, good air entry, non-labored breathing  Cardio: RRR, S1/S2, No murmur  Abdomen: Soft, Nontender, Nondistended; surgical sites clean and intact   Extremities: No calf tenderness, No cyanosis, No pitting edema  Psych: Appropriate mood and affect    LABS:                        14.9   8.97  )-----------( 182      ( 16 May 2023 07:30 )             42.2     05-16    134  |  101  |  13  ----------------------------<  158  4.1   |  24  |  0.75    Ca    8.8      16 May 2023 07:30    TPro  7.2  /  Alb  2.9  /  TBili  1.0  /  DBili  0.5  /  AST  102  /  ALT  194  /  AlkPhos  118  05-16      Lipase, Serum: 310 U/L (05-16-23 @ 07:30)  Lipase, Serum: 680 U/L (05-15-23 @ 07:40)  Lipase, Serum: 2685 U/L (05-14-23 @ 06:00)      Culture - Blood (collected 11 May 2023 17:00)  Source: .Blood Blood  Gram Stain (13 May 2023 16:40):    Growth in aerobic bottle: Gram Negative Rods  Final Report (15 May 2023 08:55):    Growth in aerobic bottle: Escherichia coli    ***Blood Panel PCR results on this specimen are available    approximately 3 hours after the Gram stain result.***    Gram stain, PCR, and/or culture results may not always    correspond due to difference in methodologies.    ************************************************************    This PCR assay was performed by multiplex PCR. This    Assay tests for 66 bacterial and resistance gene targets.    Please refer to the [facility] test directory    at [email or web address] for details.  Organism: Blood Culture PCR  Escherichia coli (15 May 2023 08:55)  Organism: Escherichia coli (15 May 2023 08:55)      Method Type: KAY      -  Amikacin: S <=16      -  Ampicillin: R >16 These ampicillin results predict results for amoxicillin      -  Ampicillin/Sulbactam: R >16/8 Enterobacter, Klebsiella aerogenes, Citrobacter, and Serratia may develop resistance during prolonged therapy (3-4 days)      -  Aztreonam: S <=4      -  Cefazolin: I 4 Enterobacter, Klebsiella aerogenes, Citrobacter, and Serratia may develop resistance during prolonged therapy (3-4 days)      -  Cefepime: S <=2      -  Cefoxitin: S <=8      -  Ceftriaxone: S <=1 Enterobacter, Klebsiella aerogenes, Citrobacter, and Serratia may develop resistance during prolonged therapy      -  Ciprofloxacin: S <=0.25      -  Ertapenem: S <=0.5      -  Gentamicin: S <=2      -  Imipenem: S <=1      -  Levofloxacin: S <=0.5      -  Meropenem: S <=1      -  Piperacillin/Tazobactam: S <=8      -  Tobramycin: S <=2      -  Trimethoprim/Sulfamethoxazole: S <=0.5/9.5  Organism: Blood Culture PCR (15 May 2023 08:55)      Method Type: PCR      -  Escherichia coli: Detec    Culture - Blood (collected 11 May 2023 17:00)  Source: .Blood Blood  Preliminary Report (12 May 2023 22:03):    No growth to date.    Culture - Urine (collected 11 May 2023 16:10)  Source: Clean Catch Clean Catch (Midstream)  Final Report (12 May 2023 16:46):    <10,000 CFU/mL Normal Urogenital Lela        RADIOLOGY & ADDITIONAL TESTS:    Care Discussed with Consultants/Other Providers:

## 2023-05-16 NOTE — DISCHARGE NOTE PROVIDER - NSDCCPCAREPLAN_GEN_ALL_CORE_FT
PRINCIPAL DISCHARGE DIAGNOSIS  Diagnosis: Acute cholangitis  Assessment and Plan of Treatment: You were admitted for acute cholangitis.  You underwent ERCP with biliary stent placement on 5/12/23.  You underwent laparoscopic cholecystectomy and umbilical hernia repair on 5/15/23.   Please follow up with Dr. Green in one week.     PRINCIPAL DISCHARGE DIAGNOSIS  Diagnosis: Acute cholangitis  Assessment and Plan of Treatment: You were admitted for acute cholangitis.  You underwent ERCP with biliary stent placement on 5/12/23.  You underwent laparoscopic cholecystectomy and umbilical hernia repair on 5/15/23.   Continue taking oral antibiotics to complete full 10 day course  Please follow up with Dr. Green in one week.     PRINCIPAL DISCHARGE DIAGNOSIS  Diagnosis: Acute cholangitis  Assessment and Plan of Treatment: You were admitted for acute cholangitis.  You underwent ERCP with biliary stent placement on 5/12/23.  You underwent laparoscopic cholecystectomy and umbilical hernia repair on 5/15/23.   Continue taking oral antibiotics to complete full 10 day course  Please follow up with Dr. Green and with Dr. Ryan in one week.

## 2023-05-16 NOTE — DISCHARGE NOTE PROVIDER - PROVIDER TOKENS
PROVIDER:[TOKEN:[[id number]]],PROVIDER:[TOKEN:[[id number]]] PROVIDER:[TOKEN:[[id number]]],PROVIDER:[TOKEN:[[id number]]],PROVIDER:[TOKEN:[[id number]]]

## 2023-05-16 NOTE — PROGRESS NOTE ADULT - PROVIDER SPECIALTY LIST ADULT
Hospitalist
Surgery
Hospitalist
Surgery
Surgery
Gastroenterology

## 2023-05-16 NOTE — CHART NOTE - NSCHARTNOTEFT_GEN_A_CORE
Patient s/p lap ian. Surgery note appreciated.  Liver tests improved.  Will need outpatient follow up with Dr. Ryan to arrange removal of stent.  Will sign off. Please reconsult as needed.

## 2023-05-16 NOTE — PROGRESS NOTE ADULT - SUBJECTIVE AND OBJECTIVE BOX
POD 1 Laparoscopic cholecystectomy, Repair of reducible anterior abdominal wall hernia without history of prior repair, with insertion of mesh. This morning pt comfortable tolerating diet, voiding denies N/V/CP/SOB    PAST MEDICAL & SURGICAL HISTORY:  No pertinent past medical history      No significant past surgical history      MEDICATIONS  (STANDING):  amLODIPine   Tablet 5 milliGRAM(s) Oral daily  aspirin enteric coated 81 milliGRAM(s) Oral daily  enoxaparin Injectable 40 milliGRAM(s) SubCutaneous every 24 hours  indomethacin Suppository 50 milliGRAM(s) Rectal once  piperacillin/tazobactam IVPB.. 3.375 Gram(s) IV Intermittent every 8 hours    MEDICATIONS  (PRN):  acetaminophen     Tablet .. 650 milliGRAM(s) Oral every 6 hours PRN Mild Pain (1 - 3)  aluminum hydroxide/magnesium hydroxide/simethicone Suspension 30 milliLiter(s) Oral every 4 hours PRN Dyspepsia  melatonin 3 milliGRAM(s) Oral at bedtime PRN Insomnia  ondansetron Injectable 4 milliGRAM(s) IV Push every 6 hours PRN Nausea and/or Vomiting  oxyCODONE    IR 10 milliGRAM(s) Oral every 6 hours PRN Severe Pain (7 - 10)  oxyCODONE    IR 5 milliGRAM(s) Oral every 6 hours PRN Moderate Pain (4 - 6)    PE: Vital Signs Last 24 Hrs  T(C): 36.9 (16 May 2023 05:22), Max: 36.9 (15 May 2023 19:50)  T(F): 98.4 (16 May 2023 05:22), Max: 98.4 (15 May 2023 19:50)  HR: 68 (16 May 2023 05:22) (68 - 110)  BP: 145/102 (16 May 2023 05:22) (125/102 - 145/102)  BP(mean): 116 (16 May 2023 05:22) (116 - 116)  RR: 24 (16 May 2023 05:22) (16 - 24)  SpO2: 96% (16 May 2023 05:22) (91% - 96%)    Parameters below as of 16 May 2023 05:22  Patient On (Oxygen Delivery Method): room air    Gen: A&O x3 nad  abd: softly distended, ng, incisions c/d/i  gu: voiding   LE: calfs soft, nontender, no swelling bilat                          14.9   8.97  )-----------( 182      ( 16 May 2023 07:30 )             42.2   05-16    134<L>  |  101  |  13  ----------------------------<  158<H>  4.1   |  24  |  0.75    Ca    8.8      16 May 2023 07:30    TPro  7.2  /  Alb  2.9<L>  /  TBili  1.0  /  DBili  0.5<H>  /  AST  102<H>  /  ALT  194<H>  /  AlkPhos  118  05-16

## 2023-05-16 NOTE — DISCHARGE NOTE PROVIDER - HOSPITAL COURSE
Hospital Course  HPI:  63yo male PMH of HTN presenting to Er with cc of abdominal pain.  Patient stated that his pain started acutely in the middle of his abdomen since 9AM.  He stated he drank some fer hoping that it would get better but it progressively worsened.  At 1pm he started to vomit and his pain traveled to his RUQ.  He decided to come to the ER for further evaluation.  He denies fever, cough, chest pain, Travel hx, diarrhea.  He has never had a colonoscopy in the past.  In the ER, GI was called (11 May 2023 17:41)    Patient was admitted to medicine for acute cholangitis with severe sepsis and E. Coli bacteremia. He underwent ERCP 5/12 with stent placed. He then had lap ian and umbilical hernia repair (without mesh) with Dr Green on 5/15. ID was consulted for bacteremia, recommended IV zosyn and transition to cefdinir 300mg PO BID upon DC to complete a 10 day course. Patient is tolerating regular diet. Ambulatory. Cleared by surgery. Stable for            You were admitted for   You were diagnosed with   You were treated with   You were prescribed the following new medications:    You will need to follow up with your primary care physician.    Source of Infection:  Antibiotic / Last Day:    Palliative Care / Advanced Care Planning  Code Status:  Patient/Family agreeable to Hospice/Palliative (Y/N)?  Summary of Goals of Care Conversation:    Discharging Provider:  Prudence Mchugh NP  Contact Info: Cell [phone] - Please call with any questions or concerns.    Outpatient Provider:    Signout given to  SNF Provider:   Hospital Course  HPI:  61yo male PMH of HTN presenting to Er with cc of abdominal pain.  Patient stated that his pain started acutely in the middle of his abdomen since 9AM.  He stated he drank some fer hoping that it would get better but it progressively worsened.  At 1pm he started to vomit and his pain traveled to his RUQ.  He decided to come to the ER for further evaluation.  He denies fever, cough, chest pain, Travel hx, diarrhea.  He has never had a colonoscopy in the past.  In the ER, GI was called (11 May 2023 17:41)    Patient was admitted to medicine for acute cholangitis with severe sepsis and E. Coli bacteremia. He underwent ERCP 5/12 with stent placed. He then had lap ian and umbilical hernia repair (without mesh) with Dr Green on 5/15. ID was consulted for bacteremia, recommended IV zosyn and transition to cefdinir 300mg PO BID upon DC to complete a 10 day course. Patient is tolerating regular diet. Ambulatory. Cleared by surgery. Stable for DC home with outpatient follow up.             You were admitted for acute cholangitis.  You underwent ERCP with biliary stent placement on 5/12/23.  You underwent laparascopic cholescytectomy and umbilical hernia repair on 5/15/23.   Please follow up with Dr. Green in one week.   Return  You were diagnosed with   You were treated with   You were prescribed the following new medications:    You will need to follow up with your primary care physician.    Source of Infection:  Antibiotic / Last Day:    Palliative Care / Advanced Care Planning  Code Status:  Patient/Family agreeable to Hospice/Palliative (Y/N)?  Summary of Goals of Care Conversation:    Discharging Provider:  Prudence Mchugh NP  Contact Info: Cell [phone] - Please call with any questions or concerns.    Outpatient Provider:    Signout given to  SNF Provider:   Hospital Course  HPI:  63yo male PMH of HTN presenting to Er with cc of abdominal pain.  Patient stated that his pain started acutely in the middle of his abdomen since 9AM.  He stated he drank some fer hoping that it would get better but it progressively worsened.  At 1pm he started to vomit and his pain traveled to his RUQ.  He decided to come to the ER for further evaluation.  He denies fever, cough, chest pain, Travel hx, diarrhea.  He has never had a colonoscopy in the past.  In the ER, GI was called (11 May 2023 17:41)    Patient was admitted to medicine for acute cholangitis with severe sepsis and E. Coli bacteremia. He underwent ERCP 5/12 with stent placed. He then had lap ian and umbilical hernia repair (without mesh) with Dr Green on 5/15. ID was consulted for bacteremia, recommended IV zosyn and transition to cefdinir 300mg PO BID upon DC to complete a 10 day course. Patient is tolerating regular diet. Ambulatory. Cleared by surgery. Stable for DC home with outpatient follow up.     Source of Infection: E Coli Bacteremia  Antibiotic / Last Day:    Discharging Provider:  Prudence Mchugh NP  Contact Info: Cell [phone] - Please call with any questions or concerns.    Outpatient Provider: Dr Rasmussen - notified    Hospital Course  HPI:  61yo male PMH of HTN presenting to Er with cc of abdominal pain.  Patient stated that his pain started acutely in the middle of his abdomen since 9AM.  He stated he drank some fer hoping that it would get better but it progressively worsened.  At 1pm he started to vomit and his pain traveled to his RUQ.  He decided to come to the ER for further evaluation.  He denies fever, cough, chest pain, Travel hx, diarrhea.  He has never had a colonoscopy in the past.  In the ER, GI was called (11 May 2023 17:41)    Patient was admitted to medicine for acute cholangitis with severe sepsis and E. Coli bacteremia. He underwent ERCP 5/12 with stent placed. He then had lap ian and umbilical hernia repair (without mesh) with Dr Green on 5/15. ID was consulted for bacteremia, recommended IV zosyn and transition to cefdinir 300mg PO BID upon DC to complete a 10 day course. Patient is tolerating regular diet. Ambulatory. Cleared by surgery. Stable for DC home with outpatient follow up.     Source of Infection: E Coli Bacteremia  Antibiotic / Last Day: Cefdinir for 5 more days     Discharging Provider:  Prudence Mchugh NP  Contact Info: Cell [phone] - Please call with any questions or concerns.    Outpatient Provider: Dr Rasmussen - notified

## 2023-05-16 NOTE — DISCHARGE NOTE NURSING/CASE MANAGEMENT/SOCIAL WORK - PATIENT PORTAL LINK FT
You can access the FollowMyHealth Patient Portal offered by [facility] by registering at the following website: [email or web address]. By joining [employer] [facility]’s FollowMyHealth portal, you will also be able to view your health information using other applications (apps) compatible with our system.

## 2023-05-16 NOTE — PROGRESS NOTE ADULT - NS ATTEND AMEND GEN_ALL_CORE FT
Cholangitis  - improving but likely needs lap ian on Monday   - cont IV antibiotics  - follow up GI and surgery  - clear liquid diet per surgery for now
Cholangitis s/p ERCP 5/12  BC growing Ecoli. Infectious Diseases consult. c/w zosyn  Plan for LAP GB today with Dr. Green
Acute cholangitis  - for OR tomorrow 5/15 with Dr. Green - cholecystectomy  - NPO after midnight
DC home. oral abx per Infectious Diseases to complete course
ERCP today r/b/a
Patient seen and examined at the bedside. Agree with the assessment and plan of DEYSI Bland.  Appears well after ERCP w/stent placement. Monitor liver panel. Surgery follow up, plan for eventual cholecystectomy.

## 2023-05-16 NOTE — DISCHARGE NOTE PROVIDER - CARE PROVIDERS DIRECT ADDRESSES
,DirectAddress_Unknown,[email or web address] ,DirectAddress_Unknown,[email or web address]

## 2023-05-16 NOTE — DISCHARGE NOTE PROVIDER - CARE PROVIDER_API CALL
Malcolm Rasmussen ()  Internal Medicine  [address] [place] [address]  Phone: [phone]  Fax: [phone]  Follow Up Time:     Jenaro Green)  ColonRectal Surgery; Surgery  [address] [facility], [address]  [place], [place] [address]  Phone: [phone]  Fax: [phone]  Follow Up Time:    Malcolm Rasmussen ()  Internal Medicine  [address] [place] [address]  Phone: [phone]  Fax: [phone]  Follow Up Time:     Jenaro Green)  ColonRectal Surgery; Surgery  [address] [facility], [address]  [place], [place] [id number]  Phone: [phone]  Fax: [phone]  Follow Up Time:     Glen Ryan)  Gastroenterology  [address] [place] [address], [address]  [place] [address]  Phone: [phone]  Fax: [phone]  Follow Up Time:

## 2023-05-16 NOTE — PROGRESS NOTE ADULT - ASSESSMENT
61yo male PMH of HTN presenting to Er with cc of abdominal pain.    Acute cholangitis with severe sepsis  Transaminitis  Elevated bilirubin  Obstructing Gallstone in CBD  E Coli bacteremia  - s/p ERCP 5/12, stent placed  - LFTs, WBCs improving, continue to monitor  - Plan for OR today (5/15/23) with Dr. Green for lap ian   - Continue zosyn    - Diet as per surgery   - Pain control  - Surgery, GI following  - Noted E Coli sensitive to zosyn in blood cultures from 5/11, ID consult pending     HTN  - Continue norvasc 5mg daily  - On ASA 81mg daily, holding ASA for OR today, resume once cleared by surgery    DVT PPX  - Lovenox, on hold for now prior to OR    DISP: Plan for OR today    [phone] Sangita erlinda    61yo male PMH of HTN presenting to Er with cc of abdominal pain.    Acute cholangitis with severe sepsis  Transaminitis  Elevated bilirubin  Obstructing Gallstone in CBD  E Coli bacteremia  - s/p ERCP 5/12, stent placed  - LFTs, WBCs improving, continue to monitor  - s/p lap ian and umbilical hernia repair (without mesh) with Dr Green on 5/15  - Continue zosyn (day 6/10) - will switch to PO abx once ready for DC  - Diet as per surgery   - Pain control  - Surgery, GI following  - ID following    HTN  - Continue norvasc 5mg daily  - Continue aspirin    DVT PPX  - Lovenox     DISP: Home once cleared by surgery     [phone] Sangita daughter    61yo male PMH of HTN presenting to Er with cc of abdominal pain.    Acute cholangitis with severe sepsis  Transaminitis  Elevated bilirubin  Obstructing Gallstone in CBD  E Coli bacteremia  - s/p ERCP 5/12, stent placed  - LFTs, WBCs improving, continue to monitor  - s/p lap ian and umbilical hernia repair (without mesh) with Dr Green on 5/15  - Continue zosyn (day 6/10) - will switch to PO abx once ready for DC (cefdinir 300mg po bid to finish the 10 days)  - Diet as per surgery - advanced to regular diet today  - Pain control  - Surgery, GI following  - ID following    HTN  - Continue norvasc 5mg daily  - Continue aspirin    DVT PPX  - Lovenox     DISP: Home once cleared by surgery     [phone] Sangita erlinda    63yo male PMH of HTN presenting to Er with cc of abdominal pain.    Acute cholangitis with severe sepsis  Transaminitis  Elevated bilirubin  Obstructing Gallstone in CBD  E Coli bacteremia  - s/p ERCP 5/12, stent placed  - LFTs, WBCs improving, continue to monitor  - s/p lap ian and umbilical hernia repair (without mesh) with Dr Green on 5/15  - Continue zosyn (day 6/10) - will switch to PO abx once ready for DC (cefdinir 300mg po bid to finish the 10 days)  - Diet as per surgery - advanced to regular diet today  - Pain control  - Surgery, GI following  - ID following    HTN  - Continue norvasc 5mg daily  - Continue aspirin    DVT PPX  - Lovenox     DISP: Home once cleared by surgery     5/16: Updated daughter Sangita [phone]

## 2023-05-16 NOTE — DISCHARGE NOTE PROVIDER - NSDCMRMEDTOKEN_GEN_ALL_CORE_FT
Aspir 81 oral delayed release tablet: 1 orally once a day  Norvasc 5 mg oral tablet: 1 orally once a day   Aspir 81 oral delayed release tablet: 1 orally once a day  cefdinir 300 mg oral capsule: 1 cap(s) orally 2 times a day  Norvasc 5 mg oral tablet: 1 orally once a day   acetaminophen 325 mg oral tablet: 2 tab(s) orally every 6 hours As needed Mild Pain (1 - 3)  Aspir 81 oral delayed release tablet: 1 orally once a day  cefdinir 300 mg oral capsule: 1 cap(s) orally 2 times a day  Norvasc 5 mg oral tablet: 1 orally once a day   acetaminophen 325 mg oral tablet: 2 tab(s) orally every 6 hours As needed Mild Pain (1 - 3)  Aspir 81 oral delayed release tablet: 1 orally once a day  cefdinir 300 mg oral capsule: 1 cap(s) orally 2 times a day  Norvasc 5 mg oral tablet: 1 orally once a day  oxyCODONE 5 mg oral tablet: 1 tab(s) orally every 6 hours As needed Moderate Pain (4 - 6)

## 2023-05-17 VITALS
RESPIRATION RATE: 15 BRPM | DIASTOLIC BLOOD PRESSURE: 68 MMHG | TEMPERATURE: 98 F | SYSTOLIC BLOOD PRESSURE: 96 MMHG | HEART RATE: 75 BPM | OXYGEN SATURATION: 100 %

## 2023-05-17 DIAGNOSIS — K80.50 CALCULUS OF BILE DUCT W/OUT CHOLANGITIS OR CHOLECYSTITIS W/OUT OBSTRUCTION: ICD-10-CM

## 2023-05-21 LAB
CULTURE RESULTS: SIGNIFICANT CHANGE UP
CULTURE RESULTS: SIGNIFICANT CHANGE UP
SPECIMEN SOURCE: SIGNIFICANT CHANGE UP
SPECIMEN SOURCE: SIGNIFICANT CHANGE UP

## 2023-05-23 LAB — SURGICAL PATHOLOGY STUDY: SIGNIFICANT CHANGE UP

## 2023-05-25 ENCOUNTER — APPOINTMENT (OUTPATIENT)
Dept: GASTROENTEROLOGY | Facility: CLINIC | Age: 63
End: 2023-05-25

## 2023-07-05 ENCOUNTER — OUTPATIENT (OUTPATIENT)
Dept: OUTPATIENT SERVICES | Facility: HOSPITAL | Age: 63
LOS: 1 days | End: 2023-07-05
Payer: MEDICAID

## 2023-07-05 ENCOUNTER — TRANSCRIPTION ENCOUNTER (OUTPATIENT)
Age: 63
End: 2023-07-05

## 2023-07-05 DIAGNOSIS — Z90.49 ACQUIRED ABSENCE OF OTHER SPECIFIED PARTS OF DIGESTIVE TRACT: Chronic | ICD-10-CM

## 2023-07-05 PROCEDURE — C1769: CPT

## 2023-07-05 PROCEDURE — 85025 COMPLETE CBC W/AUTO DIFF WBC: CPT

## 2023-07-05 PROCEDURE — 83690 ASSAY OF LIPASE: CPT

## 2023-07-05 PROCEDURE — 36415 COLL VENOUS BLD VENIPUNCTURE: CPT

## 2023-07-05 PROCEDURE — 76000 FLUOROSCOPY <1 HR PHYS/QHP: CPT

## 2023-07-05 PROCEDURE — 80053 COMPREHEN METABOLIC PANEL: CPT

## 2023-07-05 PROCEDURE — C9399: CPT

## 2023-07-05 PROCEDURE — 43275 ERCP REMOVE FORGN BODY DUCT: CPT

## 2023-07-05 PROCEDURE — 43264 ERCP REMOVE DUCT CALCULI: CPT

## 2023-07-20 PROCEDURE — C9399: CPT

## 2023-07-20 PROCEDURE — 85027 COMPLETE CBC AUTOMATED: CPT

## 2023-07-20 PROCEDURE — 87186 SC STD MICRODIL/AGAR DIL: CPT

## 2023-07-20 PROCEDURE — 87040 BLOOD CULTURE FOR BACTERIA: CPT

## 2023-07-20 PROCEDURE — C2617: CPT

## 2023-07-20 PROCEDURE — 86850 RBC ANTIBODY SCREEN: CPT

## 2023-07-20 PROCEDURE — 76705 ECHO EXAM OF ABDOMEN: CPT

## 2023-07-20 PROCEDURE — 86900 BLOOD TYPING SEROLOGIC ABO: CPT

## 2023-07-20 PROCEDURE — 83735 ASSAY OF MAGNESIUM: CPT

## 2023-07-20 PROCEDURE — 81001 URINALYSIS AUTO W/SCOPE: CPT

## 2023-07-20 PROCEDURE — 83036 HEMOGLOBIN GLYCOSYLATED A1C: CPT

## 2023-07-20 PROCEDURE — 96375 TX/PRO/DX INJ NEW DRUG ADDON: CPT

## 2023-07-20 PROCEDURE — 96367 TX/PROPH/DG ADDL SEQ IV INF: CPT

## 2023-07-20 PROCEDURE — C1889: CPT

## 2023-07-20 PROCEDURE — 83690 ASSAY OF LIPASE: CPT

## 2023-07-20 PROCEDURE — 87150 DNA/RNA AMPLIFIED PROBE: CPT

## 2023-07-20 PROCEDURE — 80053 COMPREHEN METABOLIC PANEL: CPT

## 2023-07-20 PROCEDURE — 0225U NFCT DS DNA&RNA 21 SARSCOV2: CPT

## 2023-07-20 PROCEDURE — 99285 EMERGENCY DEPT VISIT HI MDM: CPT

## 2023-07-20 PROCEDURE — 84100 ASSAY OF PHOSPHORUS: CPT

## 2023-07-20 PROCEDURE — 96365 THER/PROPH/DIAG IV INF INIT: CPT

## 2023-07-20 PROCEDURE — 36415 COLL VENOUS BLD VENIPUNCTURE: CPT

## 2023-07-20 PROCEDURE — 86803 HEPATITIS C AB TEST: CPT

## 2023-07-20 PROCEDURE — C1874: CPT

## 2023-07-20 PROCEDURE — 80048 BASIC METABOLIC PNL TOTAL CA: CPT

## 2023-07-20 PROCEDURE — 85025 COMPLETE CBC W/AUTO DIFF WBC: CPT

## 2023-07-20 PROCEDURE — 76000 FLUOROSCOPY <1 HR PHYS/QHP: CPT

## 2023-07-20 PROCEDURE — 80076 HEPATIC FUNCTION PANEL: CPT

## 2023-07-20 PROCEDURE — 87077 CULTURE AEROBIC IDENTIFY: CPT

## 2023-07-20 PROCEDURE — 88304 TISSUE EXAM BY PATHOLOGIST: CPT

## 2023-07-20 PROCEDURE — 74177 CT ABD & PELVIS W/CONTRAST: CPT | Mod: MA

## 2023-07-20 PROCEDURE — 87086 URINE CULTURE/COLONY COUNT: CPT

## 2023-07-20 PROCEDURE — 74022 RADEX COMPL AQT ABD SERIES: CPT

## 2023-07-20 PROCEDURE — C1769: CPT

## 2023-07-20 PROCEDURE — 86901 BLOOD TYPING SEROLOGIC RH(D): CPT

## 2023-09-11 DIAGNOSIS — R10.9 UNSPECIFIED ABDOMINAL PAIN: ICD-10-CM

## 2023-09-12 PROBLEM — I10 ESSENTIAL (PRIMARY) HYPERTENSION: Chronic | Status: ACTIVE | Noted: 2023-07-05

## 2024-03-21 ENCOUNTER — EMERGENCY (EMERGENCY)
Facility: HOSPITAL | Age: 64
LOS: 1 days | Discharge: HOME | End: 2024-03-21
Attending: INTERNAL MEDICINE | Admitting: FAMILY MEDICINE
Payer: MEDICAID

## 2024-03-21 VITALS
HEART RATE: 98 BPM | WEIGHT: 179.9 LBS | RESPIRATION RATE: 18 BRPM | TEMPERATURE: 98 F | SYSTOLIC BLOOD PRESSURE: 137 MMHG | OXYGEN SATURATION: 97 % | HEIGHT: 65 IN | DIASTOLIC BLOOD PRESSURE: 97 MMHG

## 2024-03-21 DIAGNOSIS — Z90.49 ACQUIRED ABSENCE OF OTHER SPECIFIED PARTS OF DIGESTIVE TRACT: Chronic | ICD-10-CM

## 2024-03-21 LAB
ALBUMIN SERPL ELPH-MCNC: 3.6 G/DL — SIGNIFICANT CHANGE UP (ref 3.3–5)
ALP SERPL-CCNC: 135 U/L — HIGH (ref 40–120)
ALT FLD-CCNC: 42 U/L — SIGNIFICANT CHANGE UP (ref 10–45)
ANION GAP SERPL CALC-SCNC: 8 MMOL/L — SIGNIFICANT CHANGE UP (ref 5–17)
AST SERPL-CCNC: 24 U/L — SIGNIFICANT CHANGE UP (ref 10–40)
BASOPHILS # BLD AUTO: 0.05 K/UL — SIGNIFICANT CHANGE UP (ref 0–0.2)
BASOPHILS NFR BLD AUTO: 0.7 % — SIGNIFICANT CHANGE UP (ref 0–2)
BILIRUB SERPL-MCNC: 0.4 MG/DL — SIGNIFICANT CHANGE UP (ref 0.2–1.2)
BUN SERPL-MCNC: 19 MG/DL — SIGNIFICANT CHANGE UP (ref 7–23)
CALCIUM SERPL-MCNC: 8.7 MG/DL — SIGNIFICANT CHANGE UP (ref 8.4–10.5)
CHLORIDE SERPL-SCNC: 103 MMOL/L — SIGNIFICANT CHANGE UP (ref 96–108)
CO2 SERPL-SCNC: 26 MMOL/L — SIGNIFICANT CHANGE UP (ref 22–31)
CREAT SERPL-MCNC: 0.82 MG/DL — SIGNIFICANT CHANGE UP (ref 0.5–1.3)
D DIMER BLD IA.RAPID-MCNC: <150 NG/ML DDU — SIGNIFICANT CHANGE UP
EGFR: 99 ML/MIN/1.73M2 — SIGNIFICANT CHANGE UP
EOSINOPHIL # BLD AUTO: 0.2 K/UL — SIGNIFICANT CHANGE UP (ref 0–0.5)
EOSINOPHIL NFR BLD AUTO: 2.9 % — SIGNIFICANT CHANGE UP (ref 0–6)
GLUCOSE SERPL-MCNC: 91 MG/DL — SIGNIFICANT CHANGE UP (ref 70–99)
HCT VFR BLD CALC: 41.2 % — SIGNIFICANT CHANGE UP (ref 39–50)
HGB BLD-MCNC: 14.8 G/DL — SIGNIFICANT CHANGE UP (ref 13–17)
IMM GRANULOCYTES NFR BLD AUTO: 0.3 % — SIGNIFICANT CHANGE UP (ref 0–0.9)
INR BLD: 0.96 RATIO — SIGNIFICANT CHANGE UP (ref 0.85–1.18)
LIDOCAIN IGE QN: 81 U/L — HIGH (ref 16–77)
LYMPHOCYTES # BLD AUTO: 1.82 K/UL — SIGNIFICANT CHANGE UP (ref 1–3.3)
LYMPHOCYTES # BLD AUTO: 26 % — SIGNIFICANT CHANGE UP (ref 13–44)
MAGNESIUM SERPL-MCNC: 1.8 MG/DL — SIGNIFICANT CHANGE UP (ref 1.6–2.6)
MCHC RBC-ENTMCNC: 31.4 PG — SIGNIFICANT CHANGE UP (ref 27–34)
MCHC RBC-ENTMCNC: 35.9 GM/DL — SIGNIFICANT CHANGE UP (ref 32–36)
MCV RBC AUTO: 87.5 FL — SIGNIFICANT CHANGE UP (ref 80–100)
MONOCYTES # BLD AUTO: 0.69 K/UL — SIGNIFICANT CHANGE UP (ref 0–0.9)
MONOCYTES NFR BLD AUTO: 9.8 % — SIGNIFICANT CHANGE UP (ref 2–14)
NEUTROPHILS # BLD AUTO: 4.23 K/UL — SIGNIFICANT CHANGE UP (ref 1.8–7.4)
NEUTROPHILS NFR BLD AUTO: 60.3 % — SIGNIFICANT CHANGE UP (ref 43–77)
NRBC # BLD: 0 /100 WBCS — SIGNIFICANT CHANGE UP (ref 0–0)
PLATELET # BLD AUTO: 203 K/UL — SIGNIFICANT CHANGE UP (ref 150–400)
POTASSIUM SERPL-MCNC: 4.1 MMOL/L — SIGNIFICANT CHANGE UP (ref 3.5–5.3)
POTASSIUM SERPL-SCNC: 4.1 MMOL/L — SIGNIFICANT CHANGE UP (ref 3.5–5.3)
PROT SERPL-MCNC: 7.3 G/DL — SIGNIFICANT CHANGE UP (ref 6–8.3)
PROTHROM AB SERPL-ACNC: 11 SEC — SIGNIFICANT CHANGE UP (ref 9.5–13)
RBC # BLD: 4.71 M/UL — SIGNIFICANT CHANGE UP (ref 4.2–5.8)
RBC # FLD: 12.7 % — SIGNIFICANT CHANGE UP (ref 10.3–14.5)
SODIUM SERPL-SCNC: 137 MMOL/L — SIGNIFICANT CHANGE UP (ref 135–145)
TROPONIN I, HIGH SENSITIVITY RESULT: 4.4 NG/L — SIGNIFICANT CHANGE UP
TROPONIN I, HIGH SENSITIVITY RESULT: 4.8 NG/L — SIGNIFICANT CHANGE UP
TROPONIN I, HIGH SENSITIVITY RESULT: 4.8 NG/L — SIGNIFICANT CHANGE UP
WBC # BLD: 7.01 K/UL — SIGNIFICANT CHANGE UP (ref 3.8–10.5)
WBC # FLD AUTO: 7.01 K/UL — SIGNIFICANT CHANGE UP (ref 3.8–10.5)

## 2024-03-21 PROCEDURE — 71045 X-RAY EXAM CHEST 1 VIEW: CPT | Mod: 26

## 2024-03-21 PROCEDURE — 99285 EMERGENCY DEPT VISIT HI MDM: CPT

## 2024-03-21 PROCEDURE — 99223 1ST HOSP IP/OBS HIGH 75: CPT

## 2024-03-21 PROCEDURE — 99204 OFFICE O/P NEW MOD 45 MIN: CPT

## 2024-03-21 PROCEDURE — 93010 ELECTROCARDIOGRAM REPORT: CPT

## 2024-03-21 RX ORDER — ENOXAPARIN SODIUM 100 MG/ML
40 INJECTION SUBCUTANEOUS EVERY 24 HOURS
Refills: 0 | Status: DISCONTINUED | OUTPATIENT
Start: 2024-03-21 | End: 2024-03-24

## 2024-03-21 RX ORDER — ASPIRIN/CALCIUM CARB/MAGNESIUM 324 MG
324 TABLET ORAL ONCE
Refills: 0 | Status: COMPLETED | OUTPATIENT
Start: 2024-03-21 | End: 2024-03-21

## 2024-03-21 RX ORDER — NITROGLYCERIN 6.5 MG
1 CAPSULE, EXTENDED RELEASE ORAL ONCE
Refills: 0 | Status: COMPLETED | OUTPATIENT
Start: 2024-03-21 | End: 2024-03-21

## 2024-03-21 RX ORDER — AMLODIPINE BESYLATE 2.5 MG/1
5 TABLET ORAL DAILY
Refills: 0 | Status: DISCONTINUED | OUTPATIENT
Start: 2024-03-21 | End: 2024-03-24

## 2024-03-21 RX ORDER — LANOLIN ALCOHOL/MO/W.PET/CERES
6 CREAM (GRAM) TOPICAL AT BEDTIME
Refills: 0 | Status: DISCONTINUED | OUTPATIENT
Start: 2024-03-21 | End: 2024-03-24

## 2024-03-21 RX ORDER — POLYETHYLENE GLYCOL 3350 17 G/17G
17 POWDER, FOR SOLUTION ORAL DAILY
Refills: 0 | Status: DISCONTINUED | OUTPATIENT
Start: 2024-03-21 | End: 2024-03-24

## 2024-03-21 RX ORDER — ASPIRIN/CALCIUM CARB/MAGNESIUM 324 MG
1 TABLET ORAL
Refills: 0 | DISCHARGE

## 2024-03-21 RX ORDER — AMLODIPINE BESYLATE 2.5 MG/1
1 TABLET ORAL
Refills: 0 | DISCHARGE

## 2024-03-21 RX ORDER — ASPIRIN/CALCIUM CARB/MAGNESIUM 324 MG
81 TABLET ORAL DAILY
Refills: 0 | Status: DISCONTINUED | OUTPATIENT
Start: 2024-03-22 | End: 2024-03-24

## 2024-03-21 RX ADMIN — Medication 1 INCH(S): at 12:30

## 2024-03-21 RX ADMIN — Medication 324 MILLIGRAM(S): at 12:30

## 2024-03-21 NOTE — H&P ADULT - NSHPSOCIALHISTORY_GEN_ALL_CORE
Denies tobacco or illicit drug use.  +social EtOH use. Employed - works in [employer] store. Independent in ADLs, IADLs, and ambulation

## 2024-03-21 NOTE — H&P ADULT - ASSESSMENT
Chest Pain, rule out ACS  -Admit to telemetry  -Trend troponin, monitor EKG  -Cardiac monitoring   -ASA 81mg daily, Lipitor 40mg qhs  -HbA1C, TSH, Lipid panel  -Cardiology evaluation   -Echo    HTN     Vitals q8h  Diet: DASH  Activity: Bedrest   PT/OT as tolerated  DVT ppx: Lovenox  GI ppx: No indication for GI prophylaxis  Standard precautions: Aspiration, fall, safety, seizure, skin  Code Status  HCP  62 y/o M with PMH HTN c/o left sided chest pain admitted for chest pain r/o ACS.    Chest Pain, rule out ACS  -Admit to observation  -Trop neg x2, EKG reviewed - NSR, no acute ST-T wave abnormalities, HR 70, qtc 434. follow AM EKG  -Cardiac monitoring   -ASA 81mg daily, Lipitor 40mg qhs  -HbA1C, TSH, Lipid panel  -Cardiology consulted - Dr. Shaw   -Follow Echo    HTN   -Continue home med - norvasc 5mg daily    Vitals q8h  Diet: DASH  Activity: Bedrest   PT/OT as tolerated  DVT ppx: Lovenox  GI ppx: No indication for GI prophylaxis  Standard precautions: Aspiration, fall, safety, seizure, skin  Code Status - Full Code  HCP - Daughter Sangita  updated at bedside

## 2024-03-21 NOTE — ED PROVIDER NOTE - PHYSICAL EXAMINATION
General:     NAD, well-nourished, well-appearing  Head:     NC/AT, EOMI, oral mucosa moist  Neck:     trachea midline  Lungs:     CTA b/l, no w/r/r, Left costochondral tenderness left suprascapular tenderness  CVS:     S1S2, RRR, no m/g/r  Abd:     +BS, s/nt/nd, no organomegaly  Ext:    2+ radial and pedal pulses, no c/c/e  Neuro: AAOx3, no sensory/motor deficits

## 2024-03-21 NOTE — ED PROVIDER NOTE - OBJECTIVE STATEMENT
63-year-old male history of hypertension former smoker came to the emergency room chief complaint of chest pain Constant for 1 week and also tingling and numbness in the left upper extremity worse on motion and worse on taking a deep breath patient denies any MIs or any stents in the heart denies any dizziness no sweating no shortness of breath

## 2024-03-21 NOTE — ED ADULT NURSE NOTE - OBJECTIVE STATEMENT
Pt came from home with c/o left sided chest pain x 1 week and left arm numbness and tingling x 2 days. Pt with hx HTN. Took 1 baby ASA PTA. Reports intermittent chest pains. Denies chest palpitations, SOB or any other complaints.

## 2024-03-21 NOTE — ED PROVIDER NOTE - CLINICAL SUMMARY MEDICAL DECISION MAKING FREE TEXT BOX
63-year-old male history of hypertension former smoker came to the emergency room chief complaint of chest pain Constant for 1 week and also tingling and numbness in the left upper extremity worse on motion and worse on taking a deep breath patient denies any MIs or any stents in the heart denies any dizziness no sweating no shortness of breath  EKG normal sinus rhythm at 70Inverted T wave in lead lead III 63-year-old male history of hypertension former smoker came to the emergency room chief complaint of chest pain Constant for 1 week and also tingling and numbness in the left upper extremity worse on motion and worse on taking a deep breath patient denies any MIs or any stents in the heart denies any dizziness no sweating no shortness of breath  EKG normal sinus rhythm at 70Inverted T wave in lead lead III  No prior EKG for comparison 2 sets of troponins negative patient given aspirin and nitro Case discussed with Dr. Rasmussen patient is a noncompliant patient so he recommended the patient to be admitted for observation for cardiology checkup

## 2024-03-21 NOTE — CONSULT NOTE ADULT - ASSESSMENT
chest pain, not likely ACS, normal markers  hbp    d/w patient family and dr ant THRASHER tomorrow echo pending as well  continue rx for hbp

## 2024-03-21 NOTE — H&P ADULT - NSHPLABSRESULTS_GEN_ALL_CORE
LABS:                        14.8   7.01  )-----------( 203      ( 21 Mar 2024 11:46 )             41.2     03-21    137  |  103  |  19  ----------------------------<  91  4.1   |  26  |  0.82    Ca    8.7      21 Mar 2024 11:46  Mg     1.8     03-21    TPro  7.3  /  Alb  3.6  /  TBili  0.4  /  DBili  x   /  AST  24  /  ALT  42  /  AlkPhos  135<H>  03-21    PT/INR - ( 21 Mar 2024 11:46 )   PT: 11.0 sec;   INR: 0.96 ratio           Urinalysis Basic - ( 21 Mar 2024 11:46 )    Color: x / Appearance: x / SG: x / pH: x  Gluc: 91 mg/dL / Ketone: x  / Bili: x / Urobili: x   Blood: x / Protein: x / Nitrite: x   Leuk Esterase: x / RBC: x / WBC x   Sq Epi: x / Non Sq Epi: x / Bacteria: x       CAPILLARY BLOOD GLUCOSE            Urinalysis Basic - ( 21 Mar 2024 11:46 )    Color: x / Appearance: x / SG: x / pH: x  Gluc: 91 mg/dL / Ketone: x  / Bili: x / Urobili: x   Blood: x / Protein: x / Nitrite: x   Leuk Esterase: x / RBC: x / WBC x   Sq Epi: x / Non Sq Epi: x / Bacteria: x        RADIOLOGY & ADDITIONAL TESTS:    Consultant(s) Notes Reviewed:  [x ] YES  [ ] NO  Care Discussed with Consultants/Other Providers [ x] YES  [ ] NO  Imaging Personally Reviewed:  [ ] YES  [ ] NO LABS:                        14.8   7.01  )-----------( 203      ( 21 Mar 2024 11:46 )             41.2     03-21    137  |  103  |  19  ----------------------------<  91  4.1   |  26  |  0.82    Ca    8.7      21 Mar 2024 11:46  Mg     1.8     03-21    TPro  7.3  /  Alb  3.6  /  TBili  0.4  /  DBili  x   /  AST  24  /  ALT  42  /  AlkPhos  135<H>  03-21    PT/INR - ( 21 Mar 2024 11:46 )   PT: 11.0 sec;   INR: 0.96 ratio           Urinalysis Basic - ( 21 Mar 2024 11:46 )    Color: x / Appearance: x / SG: x / pH: x  Gluc: 91 mg/dL / Ketone: x  / Bili: x / Urobili: x   Blood: x / Protein: x / Nitrite: x   Leuk Esterase: x / RBC: x / WBC x   Sq Epi: x / Non Sq Epi: x / Bacteria: x       CAPILLARY BLOOD GLUCOSE            Urinalysis Basic - ( 21 Mar 2024 11:46 )    Color: x / Appearance: x / SG: x / pH: x  Gluc: 91 mg/dL / Ketone: x  / Bili: x / Urobili: x   Blood: x / Protein: x / Nitrite: x   Leuk Esterase: x / RBC: x / WBC x   Sq Epi: x / Non Sq Epi: x / Bacteria: x        RADIOLOGY & ADDITIONAL TESTS:        Consultant(s) Notes Reviewed:  [x ] YES  [ ] NO  Care Discussed with Consultants/Other Providers [ x] YES  [ ] NO  Imaging Personally Reviewed:  [x] YES  [ ] NO

## 2024-03-21 NOTE — H&P ADULT - HISTORY OF PRESENT ILLNESS
64 y/o M with PMH HTN, non compliance, c/o chest pain  64 y/o M with PMH HTN c/o sharp, intermittent left sided chest pain x1 week, unprovoked, with associated LUE numbness/tingling x 2 days, presenting today for persistent symptoms. Denies acute stressors, per daughter at bedside, adherent to home med norvasc, asa daily. Endorses similar episode a few years prior, seen by NYTROY Sharma, cardiac workup including stress test reportedly unremarkable at that time. Denies headache, vision changes, neck pain, SALVADOR, SOB, LE edema, abd pain, nausea, vomiting, diarrhea, constipation, sick contacts or recent travel.

## 2024-03-21 NOTE — H&P ADULT - NSHPPHYSICALEXAM_GEN_ALL_CORE
T(C): 36.7 (03-21-24 @ 11:28), Max: 36.7 (03-21-24 @ 11:28)  HR: 72 (03-21-24 @ 12:54) (72 - 98)  BP: 138/109 (03-21-24 @ 12:54) (137/97 - 138/109)  RR: 18 (03-21-24 @ 12:54) (18 - 18)  SpO2: 97% (03-21-24 @ 12:54) (97% - 97%)  Wt(kg): --Vital Signs Last 24 Hrs  T(C): 36.7 (21 Mar 2024 11:28), Max: 36.7 (21 Mar 2024 11:28)  T(F): 98 (21 Mar 2024 11:28), Max: 98 (21 Mar 2024 11:28)  HR: 72 (21 Mar 2024 12:54) (72 - 98)  BP: 138/109 (21 Mar 2024 12:54) (137/97 - 138/109)  BP(mean): --  RR: 18 (21 Mar 2024 12:54) (18 - 18)  SpO2: 97% (21 Mar 2024 12:54) (97% - 97%)    Parameters below as of 21 Mar 2024 12:54  Patient On (Oxygen Delivery Method): room air        PHYSICAL EXAM:  GENERAL: NAD, well-groomed, well-developed  HEAD:  Atraumatic, Normocephalic  EYES: EOMI, PERRLA, conjunctiva and sclera clear  ENMT: No tonsillar erythema, exudates, or enlargement; Moist mucous membranes, Good dentition, No lesions  NECK: Supple, No JVD, Normal thyroid  NERVOUS SYSTEM:  Alert & Oriented X3, Good concentration; Motor Strength 5/5 B/L upper and lower extremities; DTRs 2+ intact and symmetric  CHEST/LUNG: Clear to percussion bilaterally; No rales, rhonchi, wheezing, or rubs  HEART: Regular rate and rhythm; No murmurs, rubs, or gallops  ABDOMEN: Soft, Nontender, Nondistended; Bowel sounds present  EXTREMITIES:  2+ Peripheral Pulses, No clubbing, cyanosis, or edema  LYMPH: No lymphadenopathy noted  SKIN: No rashes or lesions T(C): 36.7 (03-21-24 @ 11:28), Max: 36.7 (03-21-24 @ 11:28)  HR: 72 (03-21-24 @ 12:54) (72 - 98)  BP: 138/109 (03-21-24 @ 12:54) (137/97 - 138/109)  RR: 18 (03-21-24 @ 12:54) (18 - 18)  SpO2: 97% (03-21-24 @ 12:54) (97% - 97%)  Wt(kg): --Vital Signs Last 24 Hrs  T(C): 36.7 (21 Mar 2024 11:28), Max: 36.7 (21 Mar 2024 11:28)  T(F): 98 (21 Mar 2024 11:28), Max: 98 (21 Mar 2024 11:28)  HR: 72 (21 Mar 2024 12:54) (72 - 98)  BP: 138/109 (21 Mar 2024 12:54) (137/97 - 138/109)  BP(mean): --  RR: 18 (21 Mar 2024 12:54) (18 - 18)  SpO2: 97% (21 Mar 2024 12:54) (97% - 97%)    Parameters below as of 21 Mar 2024 12:54  Patient On (Oxygen Delivery Method): room air        PHYSICAL EXAM:  GENERAL: NAD, well-groomed, well-developed  HEAD:  Atraumatic, Normocephalic  EYES: EOMI, PERRLA, conjunctiva and sclera clear  ENMT: No tonsillar erythema, exudates, or enlargement; Moist mucous membranes, Good dentition, No lesions  NECK: Supple, No JVD, Normal thyroid  NERVOUS SYSTEM:  Alert & Oriented X3, Good concentration; Motor Strength 5/5 B/L upper and lower extremities  CHEST/LUNG: Clear to percussion bilaterally; No rales, rhonchi, wheezing, or rubs  HEART: Regular rate and rhythm; No murmurs, rubs, or gallops  ABDOMEN: Soft, Nontender, Nondistended; Bowel sounds present  EXTREMITIES:  2+ Peripheral Pulses, No clubbing, cyanosis, or edema  LYMPH: No lymphadenopathy noted  SKIN: No rashes or lesions

## 2024-03-21 NOTE — ED ADULT NURSE NOTE - NSFALLUNIVINTERV_ED_ALL_ED
Bed/Stretcher in lowest position, wheels locked, appropriate side rails in place/Call bell, personal items and telephone in reach/Instruct patient to call for assistance before getting out of bed/chair/stretcher/Non-slip footwear applied when patient is off stretcher/[place] to call system/Physically safe environment - no spills, clutter or unnecessary equipment/Purposeful proactive rounding/Room/bathroom lighting operational, light cord in reach

## 2024-03-21 NOTE — CONSULT NOTE ADULT - SUBJECTIVE AND OBJECTIVE BOX
CHIEF COMPLAINT:  Patient is a 63y old  Male who presents with a chief complaint of chest pain r/o ACS (21 Mar 2024 14:34)    HPI:  62 y/o M with PMH HTN c/o sharp, intermittent left sided chest pain x1 week, unprovoked, with associated LUE numbness/tingling x 2 days, presenting today for persistent symptoms. Denies acute stressors, per daughter at bedside, adherent to home med norvasc, asa daily. Endorses similar episode a few years prior, seen by [facility], cardiac workup including stress test reportedly unremarkable at that time. Denies headache, vision changes, neck pain, SALVADOR, SOB, LE edema, abd pain, nausea, vomiting, diarrhea, constipation, sick contacts or recent travel.  (21 Mar 2024 14:34)  Seen with family at bedside. Sharp pains lower retrosternal at rest, last seconds to a minute without sob or palpitations. No prior cardiac history.      PMH:       Hypertension        PSH:   No significant past surgical history    S/P cholecystectomy        FAMILY HISTORY:  FAMILY HISTORY:  No pertinent family history in first degree relatives        SOCIAL HISTORY:  Smoking:  remote  Alcohol:  Drugs:    ALLERGIES:  No Known Allergies      Home Medications:  acetaminophen 325 mg oral tablet: 2 tab(s) orally every 6 hours As needed Mild Pain (1 - 3) (21 Mar 2024 11:30)  Aspir 81 oral delayed release tablet: 1 orally once a day (21 Mar 2024 11:30)  Norvasc 5 mg oral tablet: 1 orally once a day (21 Mar 2024 11:30)      MEDICATIONS:  amLODIPine   Tablet 5 milliGRAM(s) Oral daily  aspirin enteric coated 81 milliGRAM(s) Oral daily  enoxaparin Injectable 40 milliGRAM(s) SubCutaneous every 24 hours  melatonin 6 milliGRAM(s) Oral at bedtime PRN  polyethylene glycol 3350 17 Gram(s) Oral daily PRN      REVIEW OF SYSTEMS:  CONSTITUTIONAL: No fever, weight loss, or fatigue  as per h and p      PHYSICAL EXAM:  T(C): 36.7 (03-21-24 @ 11:28), Max: 36.7 (03-21-24 @ 11:28)  HR: 86 (03-21-24 @ 15:39) (72 - 98)  BP: 146/112 (03-21-24 @ 15:39) (137/97 - 146/112)  RR: 18 (03-21-24 @ 15:39) (18 - 18)  SpO2: 96% (03-21-24 @ 15:39) (96% - 97%)  Wt(kg): --    GENERAL: NAD, well-groomed, well-developed  HEAD:  Atraumatic, Normocephalic  EYES: EOMI, conjunctiva and sclera clear  ENT: Moist mucous membranes,  NECK: Supple, No JVD, no bruits  CHEST/LUNG: Clear to ausculation and percussion bilaterally; No rales, rhonchi, wheezing, or rubs  HEART: Regular rate and rhythm; No murmurs, rubs, or gallops PMI non displaced.  ABDOMEN: Soft, Nontender, Nondistended; Bowel sounds present  EXTREMITIES:  2+ Peripheral Pulses, No clubbing, cyanosis, or edema  SKIN: No rashes or lesions  NERVOUS SYSTEM:  Alert & Oriented X3, No focal deficits    Cardiovascular Diagnostic Testing:  ECG:  sinus no significant st-t changes    LABS:                        14.8   7.01  )-----------( 203      ( 21 Mar 2024 11:46 )             41.2     03-21    137  |  103  |  19  ----------------------------<  91  4.1   |  26  |  0.82    Ca    8.7      21 Mar 2024 11:46  Mg     1.8     03-21    TPro  7.3  /  Alb  3.6  /  TBili  0.4  /  DBili  x   /  AST  24  /  ALT  42  /  AlkPhos  135<H>  03-21    PT/INR - ( 21 Mar 2024 11:46 )   PT: 11.0 sec;   INR: 0.96 ratio         Troponin I, High Sensitivity Result: 4.8 ng/L (03-21-24 @ 11:46)  Troponin I, High Sensitivity Result: 4.8 ng/L (03-21-24 @ 13:10)      IMAGING:  < from: Xray Chest 1 View- PORTABLE-Urgent (03.21.24 @ 12:14) >    ACC: [id number] EXAM:  XR CHEST PORTABLE URGENT 1V   ORDERED BY: DIOGO DAN     PROCEDURE DATE:  03/21/2024          INTERPRETATION:  AP chest radiograph    COMPARISON: 9/11/2016 chest x-ray.    CLINICAL INFORMATION: Chest Pain.    FINDINGS:  CATHETERS AND TUBES: None    PULMONARY: The airway is midline.  There are no airspace consolidations or radiographic evidence of   pulmonary nodules..  No pleural effusion or pneumothorax.    HEART/VASCULAR: The heart size and mediastinum configuration are within   the limits of normal.    BONES: The visualized osseous thorax is intact.    IMPRESSION:    No radiographic evidence of active chest disease..    --- End of Report ---            PARESH SIMON MD; Attending Radiologist  This document has been electronically signed. Mar 21 2024  3:41PM    < end of copied text >

## 2024-03-22 ENCOUNTER — TRANSCRIPTION ENCOUNTER (OUTPATIENT)
Age: 64
End: 2024-03-22

## 2024-03-22 ENCOUNTER — RESULT REVIEW (OUTPATIENT)
Age: 64
End: 2024-03-22

## 2024-03-22 VITALS
HEART RATE: 67 BPM | DIASTOLIC BLOOD PRESSURE: 94 MMHG | RESPIRATION RATE: 16 BRPM | OXYGEN SATURATION: 96 % | SYSTOLIC BLOOD PRESSURE: 135 MMHG | TEMPERATURE: 98 F

## 2024-03-22 LAB
A1C WITH ESTIMATED AVERAGE GLUCOSE RESULT: 5.7 % — HIGH (ref 4–5.6)
ALBUMIN SERPL ELPH-MCNC: 3.4 G/DL — SIGNIFICANT CHANGE UP (ref 3.3–5)
ALP SERPL-CCNC: 121 U/L — HIGH (ref 40–120)
ALT FLD-CCNC: 41 U/L — SIGNIFICANT CHANGE UP (ref 10–45)
ANION GAP SERPL CALC-SCNC: 9 MMOL/L — SIGNIFICANT CHANGE UP (ref 5–17)
AST SERPL-CCNC: 23 U/L — SIGNIFICANT CHANGE UP (ref 10–40)
BILIRUB SERPL-MCNC: 0.4 MG/DL — SIGNIFICANT CHANGE UP (ref 0.2–1.2)
BUN SERPL-MCNC: 17 MG/DL — SIGNIFICANT CHANGE UP (ref 7–23)
CALCIUM SERPL-MCNC: 8.4 MG/DL — SIGNIFICANT CHANGE UP (ref 8.4–10.5)
CHLORIDE SERPL-SCNC: 104 MMOL/L — SIGNIFICANT CHANGE UP (ref 96–108)
CHOLEST SERPL-MCNC: 193 MG/DL — SIGNIFICANT CHANGE UP
CO2 SERPL-SCNC: 26 MMOL/L — SIGNIFICANT CHANGE UP (ref 22–31)
CREAT SERPL-MCNC: 0.84 MG/DL — SIGNIFICANT CHANGE UP (ref 0.5–1.3)
EGFR: 98 ML/MIN/1.73M2 — SIGNIFICANT CHANGE UP
ESTIMATED AVERAGE GLUCOSE: 117 MG/DL — HIGH (ref 68–114)
GLUCOSE SERPL-MCNC: 110 MG/DL — HIGH (ref 70–99)
HCT VFR BLD CALC: 42.3 % — SIGNIFICANT CHANGE UP (ref 39–50)
HDLC SERPL-MCNC: 56 MG/DL — SIGNIFICANT CHANGE UP
HGB BLD-MCNC: 14.8 G/DL — SIGNIFICANT CHANGE UP (ref 13–17)
LIPID PNL WITH DIRECT LDL SERPL: 113 MG/DL — HIGH
MAGNESIUM SERPL-MCNC: 2.1 MG/DL — SIGNIFICANT CHANGE UP (ref 1.6–2.6)
MCHC RBC-ENTMCNC: 31.2 PG — SIGNIFICANT CHANGE UP (ref 27–34)
MCHC RBC-ENTMCNC: 35 GM/DL — SIGNIFICANT CHANGE UP (ref 32–36)
MCV RBC AUTO: 89.1 FL — SIGNIFICANT CHANGE UP (ref 80–100)
NON HDL CHOLESTEROL: 137 MG/DL — HIGH
NRBC # BLD: 0 /100 WBCS — SIGNIFICANT CHANGE UP (ref 0–0)
PLATELET # BLD AUTO: 188 K/UL — SIGNIFICANT CHANGE UP (ref 150–400)
POTASSIUM SERPL-MCNC: 3.9 MMOL/L — SIGNIFICANT CHANGE UP (ref 3.5–5.3)
POTASSIUM SERPL-SCNC: 3.9 MMOL/L — SIGNIFICANT CHANGE UP (ref 3.5–5.3)
PROT SERPL-MCNC: 7.1 G/DL — SIGNIFICANT CHANGE UP (ref 6–8.3)
RBC # BLD: 4.75 M/UL — SIGNIFICANT CHANGE UP (ref 4.2–5.8)
RBC # FLD: 12.6 % — SIGNIFICANT CHANGE UP (ref 10.3–14.5)
SODIUM SERPL-SCNC: 139 MMOL/L — SIGNIFICANT CHANGE UP (ref 135–145)
TRIGL SERPL-MCNC: 137 MG/DL — SIGNIFICANT CHANGE UP
TSH SERPL-MCNC: 1.98 UIU/ML — SIGNIFICANT CHANGE UP (ref 0.36–3.74)
WBC # BLD: 7.16 K/UL — SIGNIFICANT CHANGE UP (ref 3.8–10.5)
WBC # FLD AUTO: 7.16 K/UL — SIGNIFICANT CHANGE UP (ref 3.8–10.5)

## 2024-03-22 PROCEDURE — 83036 HEMOGLOBIN GLYCOSYLATED A1C: CPT

## 2024-03-22 PROCEDURE — 83690 ASSAY OF LIPASE: CPT

## 2024-03-22 PROCEDURE — 99285 EMERGENCY DEPT VISIT HI MDM: CPT | Mod: 25

## 2024-03-22 PROCEDURE — 71045 X-RAY EXAM CHEST 1 VIEW: CPT

## 2024-03-22 PROCEDURE — 93018 CV STRESS TEST I&R ONLY: CPT

## 2024-03-22 PROCEDURE — 93306 TTE W/DOPPLER COMPLETE: CPT | Mod: 26

## 2024-03-22 PROCEDURE — 85027 COMPLETE CBC AUTOMATED: CPT

## 2024-03-22 PROCEDURE — 78452 HT MUSCLE IMAGE SPECT MULT: CPT | Mod: 26,MC

## 2024-03-22 PROCEDURE — 93306 TTE W/DOPPLER COMPLETE: CPT

## 2024-03-22 PROCEDURE — 36415 COLL VENOUS BLD VENIPUNCTURE: CPT

## 2024-03-22 PROCEDURE — G0378: CPT

## 2024-03-22 PROCEDURE — 78452 HT MUSCLE IMAGE SPECT MULT: CPT | Mod: MC

## 2024-03-22 PROCEDURE — 84484 ASSAY OF TROPONIN QUANT: CPT

## 2024-03-22 PROCEDURE — 85379 FIBRIN DEGRADATION QUANT: CPT

## 2024-03-22 PROCEDURE — 80061 LIPID PANEL: CPT

## 2024-03-22 PROCEDURE — 99239 HOSP IP/OBS DSCHRG MGMT >30: CPT

## 2024-03-22 PROCEDURE — 85025 COMPLETE CBC W/AUTO DIFF WBC: CPT

## 2024-03-22 PROCEDURE — 93016 CV STRESS TEST SUPVJ ONLY: CPT

## 2024-03-22 PROCEDURE — 80053 COMPREHEN METABOLIC PANEL: CPT

## 2024-03-22 PROCEDURE — 83735 ASSAY OF MAGNESIUM: CPT

## 2024-03-22 PROCEDURE — 85610 PROTHROMBIN TIME: CPT

## 2024-03-22 PROCEDURE — 84443 ASSAY THYROID STIM HORMONE: CPT

## 2024-03-22 PROCEDURE — 93017 CV STRESS TEST TRACING ONLY: CPT

## 2024-03-22 PROCEDURE — 93005 ELECTROCARDIOGRAM TRACING: CPT

## 2024-03-22 RX ORDER — ATORVASTATIN CALCIUM 80 MG/1
1 TABLET, FILM COATED ORAL
Qty: 30 | Refills: 0
Start: 2024-03-22

## 2024-03-22 RX ADMIN — ENOXAPARIN SODIUM 40 MILLIGRAM(S): 100 INJECTION SUBCUTANEOUS at 14:19

## 2024-03-22 RX ADMIN — Medication 81 MILLIGRAM(S): at 14:19

## 2024-03-22 RX ADMIN — Medication 1 INCH(S): at 00:00

## 2024-03-22 NOTE — DISCHARGE NOTE NURSING/CASE MANAGEMENT/SOCIAL WORK - PATIENT PORTAL LINK FT
You can access the FollowMyHealth Patient Portal offered by [facility] by registering at the following website: [email or web address]. By joining [facility]’s FollowMyHealth portal, you will also be able to view your health information using other applications (apps) compatible with our system.

## 2024-03-22 NOTE — DISCHARGE NOTE PROVIDER - CARE PROVIDER_API CALL
Malcolm Rasmussen.  Internal Medicine  [address] [place] [address]  Phone: [phone]  Fax: [phone]  Follow Up Time:

## 2024-03-22 NOTE — DISCHARGE NOTE PROVIDER - NSDCMRMEDTOKEN_GEN_ALL_CORE_FT
acetaminophen 325 mg oral tablet: 2 tab(s) orally every 6 hours As needed Mild Pain (1 - 3)  Aspir 81 oral delayed release tablet: 1 orally once a day  Norvasc 5 mg oral tablet: 1 orally once a day   acetaminophen 325 mg oral tablet: 2 tab(s) orally every 6 hours As needed Mild Pain (1 - 3)  Aspir 81 oral delayed release tablet: 1 orally once a day  atorvastatin 10 mg oral tablet: 1 tab(s) orally once a day (at bedtime)  Norvasc 5 mg oral tablet: 1 orally once a day

## 2024-03-22 NOTE — PROGRESS NOTE ADULT - ASSESSMENT
64 y/o M with PMH HTN c/o left sided chest pain admitted for chest pain r/o ACS.    #Chest Pain, rule out ACS  -on observation  -Trop neg x3, EKG reviewed - NSR, no acute ST-T wave abnormalities, HR 70, qtc 434. follow AM EKG  -Cardiac monitoring   -ASA 81mg daily, Lipitor 40mg qhs  -HbA1C, TSH, Lipid panel  -Cardiology consulted - d/w Dr. Shaw   -no concers on echo  -f/u exercise stress test results. plan for discharge home if negative.    HTN   -Continue home med - norvasc 5mg daily    Vitals q8h  Diet: DASH  DVT ppx: Lovenox  GI ppx: No indication for GI prophylaxis  Standard precautions: Aspiration, fall, safety, seizure, skin  Code Status - Full Code  HCP - Daughter Sangita  updated at bedside

## 2024-03-22 NOTE — DISCHARGE NOTE PROVIDER - ATTENDING DISCHARGE PHYSICAL EXAMINATION:
T(C): 36.4 (03-22-24 @ 05:32), Max: 36.4 (03-22-24 @ 05:32)  HR: 67 (03-22-24 @ 05:32) (67 - 96)  BP: 135/94 (03-22-24 @ 05:32) (135/94 - 146/112)  RR: 16 (03-22-24 @ 05:32) (16 - 18)  SpO2: 96% (03-22-24 @ 05:32) (95% - 96%)  Wt(kg): --Vital Signs Last 24 Hrs  T(C): 36.4 (22 Mar 2024 05:32), Max: 36.4 (22 Mar 2024 05:32)  T(F): 97.6 (22 Mar 2024 05:32), Max: 97.6 (22 Mar 2024 05:32)  HR: 67 (22 Mar 2024 05:32) (67 - 96)  BP: 135/94 (22 Mar 2024 05:32) (135/94 - 146/112)  BP(mean): --  RR: 16 (22 Mar 2024 05:32) (16 - 18)  SpO2: 96% (22 Mar 2024 05:32) (95% - 96%)    Parameters below as of 22 Mar 2024 05:32  Patient On (Oxygen Delivery Method): room air        PHYSICAL EXAM:  GENERAL: NAD  HENT:  Atraumatic, Normocephalic; No tonsillar erythema, exudates, or enlargement; Moist mucous membranes;   EYES: EOMI, PERRLA, conjunctiva and sclera clear, no lid-lag  NECK: Supple, No JVD, Normal thyroid  NERVOUS SYSTEM:  CN II - XII intact; Sensation intact; Motor Strength 5/5 B/L upper and lower extremities. Alert and oriented x 3  CHEST/LUNG:  No rales, rhonchi, wheezing, or rubs; normal respiratory effort, no intercostal retractions; No pitting edema  HEART: Regular rate and rhythm; No murmurs, rubs, or gallops  ABDOMEN: Soft, Nontender, Nondistended; Bowel sounds present; No HSM  MUSCULOSKELETAL/EXTREMITIES:  2+ Peripheral Pulses, No clubbing, or digital cyanosis  Examination of the joints, bones, and muscles of one or more of the following six areas:  1. head and neck 2. spine, ribs, and pelvis 3. right upper extremity 4. left upper extremity 5. right lower extremity 6. left lower extremity   ROM (pain, crepitation or contracture)  SKIN: No rashes or lesions; normal texture and temperature  PSYCH: Appropriate affect

## 2024-03-22 NOTE — DISCHARGE NOTE PROVIDER - HOSPITAL COURSE
62 y/o M with PMH HTN c/o sharp, intermittent left sided chest pain x1 week, unprovoked, with associated LUE numbness/tingling x 2 days, presenting today for persistent symptoms. Denies acute stressors, per daughter at bedside, adherent to home med norvasc, asa daily. Endorses similar episode a few years prior, seen by [facility], cardiac workup including stress test reportedly unremarkable at that time. Denies headache, vision changes, neck pain, SALVADOR, SOB, LE edema, abd pain, nausea, vomiting, diarrhea, constipation, sick contacts or recent travel.    Cardiology evaluated patient and recommended echo and stress testing. Pt underwent echocardiogram and exercise stress test on 3/22/24 that was unremarkable. Troponins were trended x 3 sets and were all negative.  Pt medically stable for discharge home. Pt to follow-up with PCP-Dr. Rasmussen. 64 y/o M with PMH HTN c/o sharp, intermittent left sided chest pain x1 week, unprovoked, with associated LUE numbness/tingling x 2 days, presenting today for persistent symptoms. Denies acute stressors, per daughter at bedside, adherent to home med norvasc, asa daily. Endorses similar episode a few years prior, seen by [facility], cardiac workup including stress test reportedly unremarkable at that time. Denies headache, vision changes, neck pain, SALVADOR, SOB, LE edema, abd pain, nausea, vomiting, diarrhea, constipation, sick contacts or recent travel.    Cardiology evaluated patient and recommended echo and stress testing. Pt underwent echocardiogram and exercise stress test on 3/22/24 that was unremarkable. Troponins were trended x 3 sets and were all negative.  Pt medically stable for discharge home. Pt to follow-up with PCP-Dr. Rasmussen.     Of note, pt's LDL was not at goal, and pt was started on atorvastatin 10mg once a day.

## 2024-03-22 NOTE — PROGRESS NOTE ADULT - SUBJECTIVE AND OBJECTIVE BOX
CC: Patient is a 63y old  Male who presents with a chief complaint of chest pain r/o ACS (21 Mar 2024 15:54)      Interval History:      Patient seen and examined at bedside.  No overnight events. No chest pain.  No new complaints this morning.    ALLERGIES:  No Known Allergies    MEDICATIONS  (STANDING):  amLODIPine   Tablet 5 milliGRAM(s) Oral daily  aspirin enteric coated 81 milliGRAM(s) Oral daily  enoxaparin Injectable 40 milliGRAM(s) SubCutaneous every 24 hours    MEDICATIONS  (PRN):  melatonin 6 milliGRAM(s) Oral at bedtime PRN Insomnia  polyethylene glycol 3350 17 Gram(s) Oral daily PRN Constipation    Vital Signs Last 24 Hrs  T(F): 97.6 (22 Mar 2024 05:32), Max: 97.6 (22 Mar 2024 05:32)  HR: 67 (22 Mar 2024 05:32) (67 - 96)  BP: 135/94 (22 Mar 2024 05:32) (135/94 - 146/112)  RR: 16 (22 Mar 2024 05:32) (16 - 18)  SpO2: 96% (22 Mar 2024 05:32) (95% - 96%)  I&O's Summary    BMI (kg/m2): 29.9 (03-21-24 @ 11:28)    PHYSICAL EXAM:  GENERAL: NAD  NERVOUS SYSTEM:  CN II - XII intact; Sensation intact; follows commands  CHEST/LUNG: No rales, rhonchi, wheezing, or rubs; normal respiratory effort, no intercostal retractions  HEART: RRR; No murmurs, rubs, or gallops  ABDOMEN: Soft, Nontender, Nondistended; Bowel sounds present; No HSM or masses  MSK/EXT:  No peripheral edema, 2+ Peripheral Pulses, No clubbing or digital cyanosis  PSYCH: Appropriate affect, Alert & Oriented x 3, Good Memory; Good insight    LABS:                        14.8   7.16  )-----------( 188      ( 22 Mar 2024 05:54 )             42.3       03-22    139  |  104  |  17  ----------------------------<  110  3.9   |  26  |  0.84    Ca    8.4      22 Mar 2024 05:54  Mg     2.1     03-22    TPro  7.1  /  Alb  3.4  /  TBili  0.4  /  DBili  x   /  AST  23  /  ALT  41  /  AlkPhos  121  03-22       PT/INR - ( 21 Mar 2024 11:46 )   PT: 11.0 sec;   INR: 0.96 ratio              CARDIAC MARKERS ( 21 Mar 2024 18:10 )  x     / 4.4 ng/L / x     / x     / x      CARDIAC MARKERS ( 21 Mar 2024 13:10 )  x     / 4.8 ng/L / x     / x     / x      CARDIAC MARKERS ( 21 Mar 2024 11:46 )  x     / 4.8 ng/L / x     / x     / x          03-22 Chol 193 mg/dL LDL -- HDL 56 mg/dL Trig 137 mg/dL  TSH 1.984   TSH with FT4 reflex --  Total T3 --        Urinalysis Basic - ( 22 Mar 2024 05:54 )    Color: x / Appearance: x / SG: x / pH: x  Gluc: 110 mg/dL / Ketone: x  / Bili: x / Urobili: x   Blood: x / Protein: x / Nitrite: x   Leuk Esterase: x / RBC: x / WBC x   Sq Epi: x / Non Sq Epi: x / Bacteria: x        Care Discussed with Consultants/Other Providers: Yes

## 2024-03-22 NOTE — DISCHARGE NOTE PROVIDER - NSDCCPCAREPLAN_GEN_ALL_CORE_FT
PRINCIPAL DISCHARGE DIAGNOSIS  Diagnosis: Acute chest pain  Assessment and Plan of Treatment: your cardiac stress test was normal. Your cholesterol level was a little high, you are being started on medication to help lower it, atorvastatin 10mg once a day. Please follow-up with your primary care physician after discharge.

## (undated) DEVICE — SOL IRR POUR H2O 1000ML

## (undated) DEVICE — TUBING CAP SET ERBEFLO CLEVERCAP HYBRID CO2 FOR OLYMPUS SCOPES AND UCR

## (undated) DEVICE — OMNIPAQUE 300  30ML

## (undated) DEVICE — FORCEP RESCUE COMBO

## (undated) DEVICE — KIT ENDO PROCEDURE CUST W/VLV